# Patient Record
Sex: FEMALE | Race: WHITE | Employment: OTHER | URBAN - METROPOLITAN AREA
[De-identification: names, ages, dates, MRNs, and addresses within clinical notes are randomized per-mention and may not be internally consistent; named-entity substitution may affect disease eponyms.]

---

## 2017-01-09 ENCOUNTER — OFFICE VISIT (OUTPATIENT)
Dept: CARDIOLOGY CLINIC | Age: 57
End: 2017-01-09

## 2017-01-09 VITALS
WEIGHT: 201 LBS | BODY MASS INDEX: 34.31 KG/M2 | OXYGEN SATURATION: 93 % | HEIGHT: 64 IN | HEART RATE: 77 BPM | SYSTOLIC BLOOD PRESSURE: 116 MMHG | DIASTOLIC BLOOD PRESSURE: 72 MMHG

## 2017-01-09 DIAGNOSIS — R00.0 TACHYCARDIA: Primary | ICD-10-CM

## 2017-01-09 PROCEDURE — 99214 OFFICE O/P EST MOD 30 MIN: CPT | Performed by: INTERNAL MEDICINE

## 2017-01-09 PROCEDURE — 93000 ELECTROCARDIOGRAM COMPLETE: CPT | Performed by: INTERNAL MEDICINE

## 2017-02-11 RX ORDER — METOPROLOL SUCCINATE 50 MG/1
TABLET, EXTENDED RELEASE ORAL
Qty: 45 TABLET | Refills: 6 | Status: SHIPPED | OUTPATIENT
Start: 2017-02-11 | End: 2017-09-15 | Stop reason: SDUPTHER

## 2017-03-07 ENCOUNTER — OFFICE VISIT (OUTPATIENT)
Dept: PULMONOLOGY | Age: 57
End: 2017-03-07

## 2017-03-07 VITALS
OXYGEN SATURATION: 93 % | TEMPERATURE: 98.6 F | DIASTOLIC BLOOD PRESSURE: 70 MMHG | HEART RATE: 82 BPM | SYSTOLIC BLOOD PRESSURE: 122 MMHG | HEIGHT: 64 IN | RESPIRATION RATE: 16 BRPM | BODY MASS INDEX: 34.66 KG/M2 | WEIGHT: 203 LBS

## 2017-03-07 DIAGNOSIS — J96.11 CHRONIC RESPIRATORY FAILURE WITH HYPOXIA (HCC): ICD-10-CM

## 2017-03-07 DIAGNOSIS — J32.9 RHINOSINUSITIS: ICD-10-CM

## 2017-03-07 DIAGNOSIS — J44.9 MODERATE COPD (CHRONIC OBSTRUCTIVE PULMONARY DISEASE) (HCC): Primary | ICD-10-CM

## 2017-03-07 DIAGNOSIS — J31.0 RHINOSINUSITIS: ICD-10-CM

## 2017-03-07 DIAGNOSIS — G47.33 OSA TREATED WITH BIPAP: ICD-10-CM

## 2017-03-07 PROCEDURE — 99214 OFFICE O/P EST MOD 30 MIN: CPT | Performed by: INTERNAL MEDICINE

## 2017-03-07 PROCEDURE — G8484 FLU IMMUNIZE NO ADMIN: HCPCS | Performed by: INTERNAL MEDICINE

## 2017-03-07 PROCEDURE — 4004F PT TOBACCO SCREEN RCVD TLK: CPT | Performed by: INTERNAL MEDICINE

## 2017-03-07 PROCEDURE — 3017F COLORECTAL CA SCREEN DOC REV: CPT | Performed by: INTERNAL MEDICINE

## 2017-03-07 PROCEDURE — G8926 SPIRO NO PERF OR DOC: HCPCS | Performed by: INTERNAL MEDICINE

## 2017-03-07 PROCEDURE — G8417 CALC BMI ABV UP PARAM F/U: HCPCS | Performed by: INTERNAL MEDICINE

## 2017-03-07 PROCEDURE — 3023F SPIROM DOC REV: CPT | Performed by: INTERNAL MEDICINE

## 2017-03-07 PROCEDURE — G8427 DOCREV CUR MEDS BY ELIG CLIN: HCPCS | Performed by: INTERNAL MEDICINE

## 2017-03-07 PROCEDURE — 3014F SCREEN MAMMO DOC REV: CPT | Performed by: INTERNAL MEDICINE

## 2017-03-07 PROCEDURE — G8598 ASA/ANTIPLAT THER USED: HCPCS | Performed by: INTERNAL MEDICINE

## 2017-03-07 RX ORDER — MONTELUKAST SODIUM 10 MG/1
10 TABLET ORAL NIGHTLY
Qty: 30 TABLET | Refills: 6 | Status: SHIPPED | OUTPATIENT
Start: 2017-03-07 | End: 2017-10-12 | Stop reason: SDUPTHER

## 2017-03-07 RX ORDER — ALBUTEROL SULFATE 90 UG/1
2 AEROSOL, METERED RESPIRATORY (INHALATION) EVERY 4 HOURS PRN
Qty: 1 INHALER | Refills: 11 | Status: SHIPPED | OUTPATIENT
Start: 2017-03-07 | End: 2017-10-25 | Stop reason: SDUPTHER

## 2017-03-07 ASSESSMENT — SLEEP AND FATIGUE QUESTIONNAIRES
HOW LIKELY ARE YOU TO NOD OFF OR FALL ASLEEP WHILE SITTING QUIETLY AFTER LUNCH WITHOUT ALCOHOL: 0
HOW LIKELY ARE YOU TO NOD OFF OR FALL ASLEEP WHILE WATCHING TV: 0
HOW LIKELY ARE YOU TO NOD OFF OR FALL ASLEEP WHILE SITTING INACTIVE IN A PUBLIC PLACE: 1
HOW LIKELY ARE YOU TO NOD OFF OR FALL ASLEEP WHEN YOU ARE A PASSENGER IN A CAR FOR AN HOUR WITHOUT A BREAK: 0
ESS TOTAL SCORE: 4
HOW LIKELY ARE YOU TO NOD OFF OR FALL ASLEEP WHILE LYING DOWN TO REST IN THE AFTERNOON WHEN CIRCUMSTANCES PERMIT: 3
HOW LIKELY ARE YOU TO NOD OFF OR FALL ASLEEP WHILE SITTING AND READING: 0
HOW LIKELY ARE YOU TO NOD OFF OR FALL ASLEEP WHILE SITTING AND TALKING TO SOMEONE: 0
HOW LIKELY ARE YOU TO NOD OFF OR FALL ASLEEP IN A CAR, WHILE STOPPED FOR A FEW MINUTES IN TRAFFIC: 0

## 2017-09-13 ENCOUNTER — TELEPHONE (OUTPATIENT)
Dept: PHARMACY | Facility: CLINIC | Age: 57
End: 2017-09-13

## 2017-09-15 RX ORDER — METOPROLOL SUCCINATE 50 MG/1
TABLET, EXTENDED RELEASE ORAL
Qty: 30 TABLET | Refills: 6 | Status: SHIPPED | OUTPATIENT
Start: 2017-09-15 | End: 2018-02-07 | Stop reason: SDUPTHER

## 2017-10-12 DIAGNOSIS — J31.0 RHINOSINUSITIS: ICD-10-CM

## 2017-10-12 DIAGNOSIS — J32.9 RHINOSINUSITIS: ICD-10-CM

## 2017-10-13 RX ORDER — MONTELUKAST SODIUM 10 MG/1
TABLET ORAL
Qty: 30 TABLET | Refills: 0 | Status: SHIPPED | OUTPATIENT
Start: 2017-10-13 | End: 2017-11-13 | Stop reason: SDUPTHER

## 2017-10-25 ENCOUNTER — OFFICE VISIT (OUTPATIENT)
Dept: PULMONOLOGY | Age: 57
End: 2017-10-25

## 2017-10-25 VITALS
BODY MASS INDEX: 33.46 KG/M2 | DIASTOLIC BLOOD PRESSURE: 72 MMHG | SYSTOLIC BLOOD PRESSURE: 114 MMHG | HEIGHT: 64 IN | OXYGEN SATURATION: 95 % | WEIGHT: 196 LBS | TEMPERATURE: 97.7 F | HEART RATE: 73 BPM

## 2017-10-25 DIAGNOSIS — J32.9 RHINOSINUSITIS: ICD-10-CM

## 2017-10-25 DIAGNOSIS — F17.210 NICOTINE DEPENDENCE, CIGARETTES, UNCOMPLICATED: ICD-10-CM

## 2017-10-25 DIAGNOSIS — J31.0 RHINOSINUSITIS: ICD-10-CM

## 2017-10-25 DIAGNOSIS — J96.11 CHRONIC RESPIRATORY FAILURE WITH HYPOXIA (HCC): ICD-10-CM

## 2017-10-25 DIAGNOSIS — J44.9 MODERATE COPD (CHRONIC OBSTRUCTIVE PULMONARY DISEASE) (HCC): Primary | ICD-10-CM

## 2017-10-25 PROCEDURE — G8926 SPIRO NO PERF OR DOC: HCPCS | Performed by: INTERNAL MEDICINE

## 2017-10-25 PROCEDURE — G8417 CALC BMI ABV UP PARAM F/U: HCPCS | Performed by: INTERNAL MEDICINE

## 2017-10-25 PROCEDURE — 3017F COLORECTAL CA SCREEN DOC REV: CPT | Performed by: INTERNAL MEDICINE

## 2017-10-25 PROCEDURE — G8598 ASA/ANTIPLAT THER USED: HCPCS | Performed by: INTERNAL MEDICINE

## 2017-10-25 PROCEDURE — G0296 VISIT TO DETERM LDCT ELIG: HCPCS | Performed by: INTERNAL MEDICINE

## 2017-10-25 PROCEDURE — 4004F PT TOBACCO SCREEN RCVD TLK: CPT | Performed by: INTERNAL MEDICINE

## 2017-10-25 PROCEDURE — 3014F SCREEN MAMMO DOC REV: CPT | Performed by: INTERNAL MEDICINE

## 2017-10-25 PROCEDURE — G8427 DOCREV CUR MEDS BY ELIG CLIN: HCPCS | Performed by: INTERNAL MEDICINE

## 2017-10-25 PROCEDURE — 99214 OFFICE O/P EST MOD 30 MIN: CPT | Performed by: INTERNAL MEDICINE

## 2017-10-25 PROCEDURE — 3023F SPIROM DOC REV: CPT | Performed by: INTERNAL MEDICINE

## 2017-10-25 PROCEDURE — G8484 FLU IMMUNIZE NO ADMIN: HCPCS | Performed by: INTERNAL MEDICINE

## 2017-10-25 RX ORDER — ALBUTEROL SULFATE 90 UG/1
2 AEROSOL, METERED RESPIRATORY (INHALATION) EVERY 4 HOURS PRN
Qty: 1 INHALER | Refills: 11 | Status: SHIPPED | OUTPATIENT
Start: 2017-10-25 | End: 2018-09-05

## 2017-10-25 NOTE — PROGRESS NOTES
Procedure Laterality Date    ABDOMEN SURGERY       x 2    CARDIAC SURGERY      CHOLECYSTECTOMY      CORONARY ANGIOPLASTY WITH STENT PLACEMENT      LEEP      abnormal cells (cancerous)     Current Outpatient Prescriptions   Medication Sig Dispense Refill    Cyanocobalamin (VITAMIN B 12 PO) Take by mouth      SYMBICORT 160-4.5 MCG/ACT AERO INHALE 2 PUFFS TWICE DAILY 10.2 g 0    fenofibrate (TRICOR) 145 MG tablet TAKE 1 TABLET BY MOUTH DAILY 30 tablet 0    SPIRIVA HANDIHALER 18 MCG inhalation capsule INHALE 1 DOSE BY MOUTH ONCE DAILY 30 capsule 0    montelukast (SINGULAIR) 10 MG tablet TAKE 1 TABLET BY MOUTH EVERY NIGHT 30 tablet 0    metFORMIN (GLUCOPHAGE) 1000 MG tablet TAKE 1 TABLET BY MOUTH TWICE DAILY 60 tablet 0    Insulin Syringe-Needle U-100 (INSULIN SYRINGE .5CC/30GX5/16\") 30G X 5/16\" 0.5 ML MISC AS DIRECTED THREE TIMES DAILY 100 each 3    NOVOLOG 100 UNIT/ML injection vial USE FOR SLIDING SCALE 10 mL 0    metoprolol succinate (TOPROL XL) 50 MG extended release tablet Take one and one half tablet daily 30 tablet 6    nystatin (MYCOSTATIN) 433917 UNIT/GM powder Apply 3 times daily.  1 Bottle 2    sertraline (ZOLOFT) 100 MG tablet TAKE 2 TABLETS BY MOUTH EVERY NIGHT AT BEDTIME 60 tablet 0    lisinopril (PRINIVIL;ZESTRIL) 20 MG tablet TAKE 1 TABLET BY MOUTH DAILY 30 tablet 0    levothyroxine (SYNTHROID) 25 MCG tablet TAKE 1 TABLET BY MOUTH DAILY 30 tablet 0    diazepam (VALIUM) 5 MG tablet TAKE 1 TABLET BY MOUTH EVERY 8 HOURS AS NEEDED FOR ANXIETY OR SLEEP 90 tablet 0    HUMALOG 100 UNIT/ML injection vial USE FOR SLIDING SCALE 10 mL 0    albuterol (PROVENTIL) (2.5 MG/3ML) 0.083% nebulizer solution Take 3 mLs by nebulization every 6 hours as needed for Wheezing 120 each 3    insulin glargine (LANTUS) 100 UNIT/ML injection vial Inject 22 Units into the skin nightly 10 mL 0    atorvastatin (LIPITOR) 20 MG tablet TAKE 1 TABLET BY MOUTH DAILY 30 tablet 0    albuterol (PROAIR HFA) 108 (90 BASE) MCG/ACT inhaler Inhale 2 puffs into the lungs every 4 hours as needed for Wheezing or Shortness of Breath 1 Inhaler 11    HYDROcodone-acetaminophen (NORCO) 5-325 MG per tablet Take 1 tablet by mouth every 8 hours as needed for Pain      Vitamin D (CHOLECALCIFEROL) 1000 UNITS CAPS capsule Take 1,000 Units by mouth 2 times daily.  Melatonin 5 MG TABS tablet Take 1 tablet by mouth nightly.  OXYGEN Inhale  into the lungs. 3 liters per minute      BiPAP Machine MISC by Does not apply route.  omeprazole (PRILOSEC) 20 MG capsule Take 20 mg by mouth 2 times daily.  aspirin 81 MG tablet Take 81 mg by mouth nightly.  Omega-3 Fatty Acids (FISH OIL) 1000 MG CAPS Take 1,000 mg by mouth 2 times daily. No current facility-administered medications for this visit.             ROS:  GENERAL:  No fevers, chills, or night sweats  HEENT:  Improved congestion  HEART:  No chest pain,  LUNGS: Stable SOB, low usage of albuterol  ABDOMEN:  No abdominal pains, no changes in stools  GENITOURINARY:  No increased frequency, no blood in urine  EXTREMITIES:  No swelling, no lesions  NEURO:  Back pain which is chronic  SKIN:  No new rashes, no changes in hair or nails  LYMPH:  No masses, no swelling neck, armpits, or groin    PHYSICAL EXAM:  Vitals:    10/25/17 0940   BP: 114/72   Pulse: 73   Temp: 97.7 °F (36.5 °C)   SpO2: 95%       GENERAL:  Well nourished, alert, appears stated age, no distress, older than listed age,  HEENT:  No scleral icterus, no conjunctival irritation  NECK:  No thyromegaly, no bruits  LYMPH:  No cervical or supraclavicular adenopathy  HEART:  Regular rate and rhythm, no murmurs  LUNGS: Faint inspiratory wheezes with mild expiratory wheezing too  ABDOMEN:  No distention, no organomegaly  EXTREMITIES:  trace edema, no digital clubbing  NEURO:  No localizing deficits, CN II-XII intact    Pulmonary Function Testing  9/2013  Moderate obstructive ventilatory defect with reversibility, with  reactively preserved lung volumes and mildly reduced DLCO overall results  most consistent with clinical diagnosis of bronchial asthma, status post  clinical correlation. Chest CT from 10/2016 is reviewed. My interpretation is emphysema with calcified and non-calcified nodules    6 MWT  The patient ambulated 244 meters which is abnormal- 49% predicted. Her heart rate response was normal, the blood pressure response was normal, oxygen saturations were abnormal in that she desaturated to 87% while on room air at the start of testing and required 2 liters nasal cannula to bring saturations up to 95%, and degree of symptoms were increased with testing    Lab Results   Component Value Date    WBC 12.3 (H) 02/16/2015    HGB 13.6 02/16/2015    HCT 42.1 02/16/2015    MCV 88.0 02/16/2015     02/16/2015       Lab Results   Component Value Date    BNP <5.0 08/09/2012       Lab Results   Component Value Date    CREATININE 0.5 (L) 09/20/2017    BUN 13 09/20/2017     09/20/2017    K 4.8 09/20/2017    CL 94 (L) 09/20/2017    CO2 19 (L) 09/20/2017     Assessment/Plan:  1. Moderate COPD (chronic obstructive pulmonary disease) ([facility] [address]  Continue with triple therapy. - albuterol sulfate  (90 Base) MCG/ACT inhaler; Inhale 2 puffs into the lungs every 4 hours as needed for Wheezing or Shortness of Breath (or cough)  Dispense: 1 Inhaler; Refill: 11    2. Chronic respiratory failure with hypoxia (HCC)  Use 3 liters with exertion. She does get benefit from the oxygen    3. Rhinosinusitis  Improved with singulair. She does not like intra-nasal medications    4. Nicotine dependence, cigarettes, uncomplicated  CT Screen due this month. She needs to continue to work on decreasing her cigarette usage. She has cut it in half  - ND VISIT TO DISCUSS LUNG CA SCREEN W LDCT  - CT Lung Screening; Future    Follow up in 6 months    Pulmonary Rehab:   Won't do    Lung Cancer Screening CT:  Ordered today    Low Dose CT

## 2017-10-25 NOTE — PATIENT INSTRUCTIONS
Continue with inhalers and oxygen    Make sure you get your flu shot soon    CT Chest in the next week or 2    Follow up in 6 months    What is lung cancer screening? Lung cancer screening is a way in which doctors check the lungs for early signs of cancer in people who have no symptoms of lung cancer. A low-dose CT scan uses much less radiation than a normal CT scan and shows a more detailed image of the lungs than a standard X-ray. The goal of lung cancer screening is to find cancer early, before it has a chance to grow, spread, or cause problems. One large study found that smokers who were screened with low-dose CT scans were less likely to die of lung cancer than those who were screened with standard X-ray. Below is a summary of the things you need to know regarding screening for lung cancer with low-dose computed tomography (LDCT). This is a screening program that involves routine annual screening with LDCT studies of the lung. The LDCTs are done using low-dose radiation that is not thought to increase your cancer risk. If you have other serious medical conditions (other cancers, congestive heart failure) that limit your life expectancy to less than 10 years, you should not undergo lung cancer screening with LDCT. The chance is 20%-60% that the LDCT result will show abnormalities. This would require additional testing which could include repeat imaging or even invasive procedures. Most (about 95%) of \"abnormal\" LDCT results are false in the sense that no lung cancer is ultimately found. Additionally, some (about 10%) of the cancers found would not affect your life expectancy, even if undetected and untreated. If you are still smoking, the single most important thing that you can do to reduce your risk of dying of lung cancer is to quit. For this screening to be covered by Medicare and most other insurers, strict criteria must be met.   If you do not meet these criteria, but still wish to

## 2017-11-13 DIAGNOSIS — J31.0 RHINOSINUSITIS: ICD-10-CM

## 2017-11-13 DIAGNOSIS — J32.9 RHINOSINUSITIS: ICD-10-CM

## 2017-11-13 RX ORDER — MONTELUKAST SODIUM 10 MG/1
TABLET ORAL
Qty: 30 TABLET | Refills: 0 | Status: SHIPPED | OUTPATIENT
Start: 2017-11-13 | End: 2017-12-14 | Stop reason: SDUPTHER

## 2017-12-14 DIAGNOSIS — J32.9 RHINOSINUSITIS: ICD-10-CM

## 2017-12-14 DIAGNOSIS — J31.0 RHINOSINUSITIS: ICD-10-CM

## 2017-12-18 RX ORDER — MONTELUKAST SODIUM 10 MG/1
TABLET ORAL
Qty: 30 TABLET | Refills: 0 | Status: SHIPPED | OUTPATIENT
Start: 2017-12-18 | End: 2018-01-16 | Stop reason: SDUPTHER

## 2017-12-19 ENCOUNTER — TELEPHONE (OUTPATIENT)
Dept: PHARMACY | Facility: CLINIC | Age: 57
End: 2017-12-19

## 2017-12-21 NOTE — TELEPHONE ENCOUNTER
Patient had appointment on 17. Will send letter to patient.     Dewitte Apgar, PharmD  [address] [facility] Pharmacist  [phone] or [phone] (Option 7)    CLINICAL PHARMACY CONSULT: MED RECONCILIATION/REVIEW ADDENDUM    For Pharmacy Admin Tracking Only    PHSO: Yes  Time Spent (min): 5    Dewitte Apgar, [address] [place] [address]

## 2018-01-04 ENCOUNTER — HOSPITAL ENCOUNTER (OUTPATIENT)
Dept: NEUROLOGY | Age: 58
Discharge: HOME | End: 2018-03-12
Attending: INTERNAL MEDICINE | Admitting: INTERNAL MEDICINE

## 2018-01-04 DIAGNOSIS — F17.210 CIGARETTE NICOTINE DEPENDENCE, UNCOMPLICATED: ICD-10-CM

## 2018-01-04 NOTE — PROCEDURES
Electrodiagnostic Report  [address] BASHIR Kovacs MD, Michaele Goldberg, DO, Cynthia Shepard MD  Phone: [phone]  Fax: [phone]    01/04/18    Veronda Cushing  65 y.o.  1960  [id number]  Referring Provider: Maureen Campbell MD    Clinical Problem:  65 patient did not show for EMG appointment    EMG SUMMARY TABLE LEFT UPPER     Spontaneous     MUAP   Recruitment    Insertional Activity Fibrillation Potential Positive Sharp Waves   Fasiculation High Frequency Potentials   Amp Duration PPP Pattern   Deltoid C5.6 Ax normal none none none none normal normal normal normal   Biceps C5,6 musc cut normal none none none none normal normal normal normal   Triceps C6,7,8 Radial normal none none none none normal normal normal normal   Pronator Teres C6,7 Median normal none none none none normal normal normal normal   Brachio Rad C5,6 Radial normal none none none none normal normal normal normal   Ext Ind Prop C7,8 Radial normal none none none none normal normal normal normal   Oppones Poll C8-T1 Median normal none none none none normal normal normal normal   1st Dorsal Int C8-T1 Ulnar normal none none none none normal normal normal normal   Cerv Paraspinals C2-T1 PPR       normal none none none none normal normal normal normal     Nerve Conduction Studies     Nerve Sensory Distal Latency (msec) Sensory Distal Latency (msec) Amp uv Amp uv Motor Distal Latency (msec) Motor Distal Latency (msec) Amp kv Amp kv Motor NCV (m/sec) Motor NCV (m/sec)    Right Left Right Left Right Left Right Left Right Left   Median (n<3.6) (n<3.6)   (n<4.3) (n<4.3)   (n>50) (n>50)   Ulnar (n<3.7) (n<3.7)   (n<4.2) (n<4.2)   b.e(n>50)   a.e(>45) b.e(n>50)   a.e(>45)   Radial (n<3.5) (n<3.5)             Summary of EMG and Nerve Conduction Findings:     Overall Impression:    Electronically signed by:  Michaele Goldberg, DO,1/4/2018,9:59 AM

## 2018-01-16 DIAGNOSIS — J32.9 RHINOSINUSITIS: ICD-10-CM

## 2018-01-16 DIAGNOSIS — J31.0 RHINOSINUSITIS: ICD-10-CM

## 2018-01-16 RX ORDER — MONTELUKAST SODIUM 10 MG/1
TABLET ORAL
Qty: 30 TABLET | Refills: 4 | Status: SHIPPED | OUTPATIENT
Start: 2018-01-16 | End: 2018-06-19 | Stop reason: SDUPTHER

## 2018-02-08 RX ORDER — METOPROLOL SUCCINATE 50 MG/1
TABLET, EXTENDED RELEASE ORAL
Qty: 30 TABLET | Refills: 0 | Status: SHIPPED | OUTPATIENT
Start: 2018-02-08 | End: 2018-03-09 | Stop reason: SDUPTHER

## 2018-03-07 RX ORDER — METOPROLOL SUCCINATE 50 MG/1
TABLET, EXTENDED RELEASE ORAL
Qty: 30 TABLET | Refills: 0 | OUTPATIENT
Start: 2018-03-07

## 2018-03-09 RX ORDER — METOPROLOL SUCCINATE 50 MG/1
TABLET, EXTENDED RELEASE ORAL
Qty: 45 TABLET | Refills: 1 | Status: SHIPPED | OUTPATIENT
Start: 2018-03-09 | End: 2018-05-04 | Stop reason: SDUPTHER

## 2018-03-13 ENCOUNTER — HOSPITAL ENCOUNTER (OUTPATIENT)
Dept: CT IMAGING | Age: 58
Discharge: HOME | End: 2018-03-13
Admitting: INTERNAL MEDICINE

## 2018-03-13 ENCOUNTER — HOSPITAL ENCOUNTER (OUTPATIENT)
Dept: WOMENS IMAGING | Age: 58
Discharge: HOME | End: 2018-03-13
Attending: INTERNAL MEDICINE | Admitting: INTERNAL MEDICINE

## 2018-03-13 DIAGNOSIS — F17.210 CIGARETTE NICOTINE DEPENDENCE, UNCOMPLICATED: ICD-10-CM

## 2018-03-13 DIAGNOSIS — F17.210 NICOTINE DEPENDENCE, CIGARETTES, UNCOMPLICATED: ICD-10-CM

## 2018-03-13 DIAGNOSIS — Z12.39 SCREENING BREAST EXAMINATION: ICD-10-CM

## 2018-03-14 ENCOUNTER — CASE MANAGEMENT (OUTPATIENT)
Dept: CASE MANAGEMENT | Age: 58
End: 2018-03-14

## 2018-03-14 LAB
CATARACTS: POSITIVE
DIABETIC RETINOPATHY: NEGATIVE
GLAUCOMA: NEGATIVE
INTRAOCULAR PRESSURE EYE: NORMAL
VISUAL ACUITY DISTANCE LEFT EYE: NORMAL
VISUAL ACUITY DISTANCE RIGHT EYE: NORMAL

## 2018-03-16 ENCOUNTER — OFFICE VISIT (OUTPATIENT)
Dept: ORTHOPEDIC SURGERY | Age: 58
End: 2018-03-16

## 2018-03-16 VITALS
DIASTOLIC BLOOD PRESSURE: 85 MMHG | HEIGHT: 64 IN | BODY MASS INDEX: 33.46 KG/M2 | WEIGHT: 196 LBS | HEART RATE: 76 BPM | SYSTOLIC BLOOD PRESSURE: 148 MMHG

## 2018-03-16 DIAGNOSIS — M17.11 PRIMARY OSTEOARTHRITIS OF RIGHT KNEE: ICD-10-CM

## 2018-03-16 DIAGNOSIS — M25.561 RIGHT KNEE PAIN, UNSPECIFIED CHRONICITY: ICD-10-CM

## 2018-03-16 DIAGNOSIS — M76.899 QUADRICEPS TENDONITIS: Primary | ICD-10-CM

## 2018-03-16 PROCEDURE — 99203 OFFICE O/P NEW LOW 30 MIN: CPT | Performed by: ORTHOPAEDIC SURGERY

## 2018-03-16 PROCEDURE — G8427 DOCREV CUR MEDS BY ELIG CLIN: HCPCS | Performed by: ORTHOPAEDIC SURGERY

## 2018-03-16 PROCEDURE — 3017F COLORECTAL CA SCREEN DOC REV: CPT | Performed by: ORTHOPAEDIC SURGERY

## 2018-03-16 PROCEDURE — G8484 FLU IMMUNIZE NO ADMIN: HCPCS | Performed by: ORTHOPAEDIC SURGERY

## 2018-03-16 PROCEDURE — G8417 CALC BMI ABV UP PARAM F/U: HCPCS | Performed by: ORTHOPAEDIC SURGERY

## 2018-03-16 PROCEDURE — 3014F SCREEN MAMMO DOC REV: CPT | Performed by: ORTHOPAEDIC SURGERY

## 2018-03-16 RX ORDER — METHYLPREDNISOLONE 4 MG/1
TABLET ORAL
Qty: 1 KIT | Refills: 0 | Status: SHIPPED | OUTPATIENT
Start: 2018-03-16 | End: 2018-03-22

## 2018-03-16 RX ORDER — IBUPROFEN 600 MG/1
600 TABLET ORAL 2 TIMES DAILY WITH MEALS
Qty: 60 TABLET | Refills: 1 | Status: SHIPPED | OUTPATIENT
Start: 2018-03-16 | End: 2018-06-20 | Stop reason: SDUPTHER

## 2018-03-16 NOTE — PROGRESS NOTES
Hermine Skiff  [id number]  March 16, 2018    Chief Complaint   Patient presents with    Knee Pain     right, no injury, pain about 1 month       History: The patient is a 60-year-old female here today for evaluation regarding her right knee pain. She reports that she has had the pain for approximately 3-4 weeks. She denies any specific inciting event or injury. The patient does have chronic back issues and takes hydrocodone for her back. The pain medication has helped the right knee pain. She does have difficulty going up and down stairs. She does occasionally have pain at nighttime. She does have pain when straightening the leg. She denies any numbness or tingling. The patient does report that heat has helped her symptoms. This is a consult from Shawanda Lancaster MD for right knee pain. The patient's  past medical history, medications, allergies,  family history, social history, and have been reviewed, and dated and are recorded in the chart. Pertinent items are noted in HPI. Review of systems reviewed from Pertinent History Form dated on 3/16/18 and available in the patient's chart under the Media tab. Vitals:  BP (!) 148/85   Pulse 76   Ht 5' 4\" (1.626 m)   Wt 196 lb (88.9 kg)   BMI 33.64 kg/m²     Physical: Ms. Hermine Skiff appears well, she is in no apparent distress, she demonstrates appropriate mood & affect. She is alert and oriented to person, place and time. Examination of the right lower extremity reveals no pain with range of motion of the hip. She has mild medial joint line tenderness. She does have moderate tenderness palpation over the superior pole of the patella/quadriceps tendon. She has mild pain with resisted right knee extension. Range of motion is from 0 degrees to 120 degrees. Strength is 4+ to 5/5 for all muscle groups about the right knee. There is patellofemoral crepitus with range of motion of the right knee.  Varus and valgus stressing of the knees reveals no evidence of instability. There is a small effusion in the right knee. Anterior drawer and Lachman are negative bilaterally. Examination of the skin reveals no rashes, ulceration, or lesion, bilaterally in the lower extremities. Sensation to both lower extremities is grossly intact. Exam of both feet reveals pedal pulses intact and brisk cap refill. Patient is able to dorsiflex and wiggle all toes. Deep tendon reflexes of the lower extremities are normal and symmetric. Imagin views of the right knee obtained in the office today were reviewed. There is mild to moderate medial joint space narrowing. Otherwise x-rays were unremarkable. Impression: #1 right knee quadriceps tendinitis #2 right knee osteoarthritis    Plan: She was instructed on activity modification and low impact exercising: Natural history and expected course discussed. Questions answered. Rest, ice, compression, and elevation (RICE) therapy. Reduction in offending activity. OTC analgesics as needed. If the patient continues to have pain, we will consider a cortisone injection. The patient was given a prescription for a Medrol Dosepak. She was also given a prescription for ibuprofen.

## 2018-05-04 RX ORDER — METOPROLOL SUCCINATE 50 MG/1
TABLET, EXTENDED RELEASE ORAL
Qty: 45 TABLET | Refills: 1 | Status: SHIPPED | OUTPATIENT
Start: 2018-05-04 | End: 2018-05-18 | Stop reason: SDUPTHER

## 2018-05-18 ENCOUNTER — OFFICE VISIT (OUTPATIENT)
Dept: CARDIOLOGY CLINIC | Age: 58
End: 2018-05-18

## 2018-05-18 VITALS
HEIGHT: 64 IN | SYSTOLIC BLOOD PRESSURE: 126 MMHG | HEART RATE: 90 BPM | DIASTOLIC BLOOD PRESSURE: 84 MMHG | OXYGEN SATURATION: 92 % | WEIGHT: 195 LBS | BODY MASS INDEX: 33.29 KG/M2

## 2018-05-18 DIAGNOSIS — I25.10 ATHEROSCLEROSIS OF NATIVE CORONARY ARTERY OF NATIVE HEART WITHOUT ANGINA PECTORIS: Primary | ICD-10-CM

## 2018-05-18 PROCEDURE — G8417 CALC BMI ABV UP PARAM F/U: HCPCS | Performed by: INTERNAL MEDICINE

## 2018-05-18 PROCEDURE — G8427 DOCREV CUR MEDS BY ELIG CLIN: HCPCS | Performed by: INTERNAL MEDICINE

## 2018-05-18 PROCEDURE — 99214 OFFICE O/P EST MOD 30 MIN: CPT | Performed by: INTERNAL MEDICINE

## 2018-05-18 PROCEDURE — 93000 ELECTROCARDIOGRAM COMPLETE: CPT | Performed by: INTERNAL MEDICINE

## 2018-05-18 PROCEDURE — 4004F PT TOBACCO SCREEN RCVD TLK: CPT | Performed by: INTERNAL MEDICINE

## 2018-05-18 PROCEDURE — 3017F COLORECTAL CA SCREEN DOC REV: CPT | Performed by: INTERNAL MEDICINE

## 2018-05-18 PROCEDURE — G8598 ASA/ANTIPLAT THER USED: HCPCS | Performed by: INTERNAL MEDICINE

## 2018-05-18 RX ORDER — METOPROLOL SUCCINATE 100 MG/1
100 TABLET, EXTENDED RELEASE ORAL NIGHTLY
Qty: 90 TABLET | Refills: 3 | Status: SHIPPED | OUTPATIENT
Start: 2018-05-18 | End: 2018-09-05

## 2018-06-19 DIAGNOSIS — J31.0 RHINOSINUSITIS: ICD-10-CM

## 2018-06-19 DIAGNOSIS — J32.9 RHINOSINUSITIS: ICD-10-CM

## 2018-06-19 RX ORDER — MONTELUKAST SODIUM 10 MG/1
TABLET ORAL
Qty: 30 TABLET | Refills: 3 | Status: SHIPPED | OUTPATIENT
Start: 2018-06-19 | End: 2018-09-05 | Stop reason: ALTCHOICE

## 2018-06-20 DIAGNOSIS — M76.899 QUADRICEPS TENDONITIS: ICD-10-CM

## 2018-06-20 DIAGNOSIS — M17.11 PRIMARY OSTEOARTHRITIS OF RIGHT KNEE: ICD-10-CM

## 2018-06-20 RX ORDER — IBUPROFEN 600 MG/1
TABLET ORAL
Qty: 60 TABLET | Refills: 0 | Status: SHIPPED | OUTPATIENT
Start: 2018-06-20 | End: 2018-09-05

## 2018-08-16 PROBLEM — R19.7 DIARRHEA: Status: ACTIVE | Noted: 2018-08-16

## 2018-09-05 ENCOUNTER — PAT TELEPHONE (OUTPATIENT)
Dept: PREADMISSION TESTING | Age: 58
End: 2018-09-05

## 2018-09-05 VITALS — BODY MASS INDEX: 32.44 KG/M2 | WEIGHT: 190 LBS | HEIGHT: 64 IN

## 2018-09-05 RX ORDER — ALBUTEROL SULFATE 90 UG/1
AEROSOL, METERED RESPIRATORY (INHALATION)
COMMUNITY
End: 2018-09-05

## 2018-09-05 NOTE — PRE-PROCEDURE INSTRUCTIONS
nail polish on your fingers or toes      For your safety, please do not wear any jewelry or body piercing's on the day of surgery. All jewelry must be removed. If you have dentures, they will be removed before going to operating room. For your convenience, we will provide you with a container. If you wear contact lenses or glasses, they will be removed, please bring a case for them. If you have a living will and a durable power of  for healthcare, please bring in a copy. As part of our patient safety program to minimize surgical site infections, we ask you to do the following:    · Please notify your surgeon if you develop any illness between         now and the  day of your surgery. · This includes a cough, cold, fever, sore throat, nausea,         or vomiting, and diarrhea, etc.  ·  Please notify your surgeon if you experience dizziness, shortness         of breath or blurred vision between now and the time of your surgery. Do not shave your operative site 96 hours prior to surgery. For face and neck surgery, men may use an electric razor 48 hours   prior to surgery. You may shower the night before surgery or the morning of   your surgery with an antibacterial soap. You will need to bring a photo ID and insurance card    [facility] has an onsite pharmacy, would you like to utilize our pharmacy     If you will be staying overnight and use a C-pap machine, please bring   your C-pap to hospital     Our goal is to provide you with excellent care, therefore, visitors will be limited to two(2) in the room at a time so that we may focus on providing this care for you. Please contact pre-admission testing if you have any further questions.                  [facility] phone number:  [phone] PAT fax number:  [phone]  Please note these are generalized instructions for all surgical cases, you may be provided with more specific instructions according to your

## 2018-09-07 ENCOUNTER — HOSPITAL ENCOUNTER (OUTPATIENT)
Dept: ENDOSCOPY | Age: 58
Discharge: HOME | End: 2018-09-07
Attending: INTERNAL MEDICINE | Admitting: INTERNAL MEDICINE

## 2018-09-07 VITALS
HEART RATE: 72 BPM | OXYGEN SATURATION: 92 % | RESPIRATION RATE: 16 BRPM | BODY MASS INDEX: 32.39 KG/M2 | TEMPERATURE: 97.6 F | HEIGHT: 64 IN | SYSTOLIC BLOOD PRESSURE: 133 MMHG | WEIGHT: 189.7 LBS | DIASTOLIC BLOOD PRESSURE: 66 MMHG

## 2018-09-07 LAB
GLUCOSE BLD-MCNC: 154 MG/DL (ref 70–99)
GLUCOSE BLD-MCNC: 173 MG/DL (ref 70–99)
PERFORMED ON: ABNORMAL
PERFORMED ON: ABNORMAL

## 2018-09-07 RX ORDER — SODIUM CHLORIDE 9 MG/ML
INJECTION, SOLUTION INTRAVENOUS CONTINUOUS
Status: DISCONTINUED | OUTPATIENT
Start: 2018-09-07 | End: 2018-09-08 | Stop reason: HOSPADM

## 2018-09-07 RX ORDER — SODIUM CHLORIDE 0.9 % (FLUSH) 0.9 %
10 SYRINGE (ML) INJECTION PRN
Status: DISCONTINUED | OUTPATIENT
Start: 2018-09-07 | End: 2018-09-08 | Stop reason: HOSPADM

## 2018-09-07 RX ORDER — SODIUM CHLORIDE 0.9 % (FLUSH) 0.9 %
10 SYRINGE (ML) INJECTION EVERY 12 HOURS SCHEDULED
Status: DISCONTINUED | OUTPATIENT
Start: 2018-09-07 | End: 2018-09-08 | Stop reason: HOSPADM

## 2018-09-07 RX ADMIN — SODIUM CHLORIDE: 9 INJECTION, SOLUTION INTRAVENOUS at 13:38

## 2018-09-07 ASSESSMENT — PAIN DESCRIPTION - DESCRIPTORS: DESCRIPTORS: PRESSURE

## 2018-09-07 ASSESSMENT — PAIN - FUNCTIONAL ASSESSMENT: PAIN_FUNCTIONAL_ASSESSMENT: 0-10

## 2018-09-07 ASSESSMENT — PAIN DESCRIPTION - LOCATION: LOCATION: ABDOMEN

## 2018-09-07 ASSESSMENT — PAIN SCALES - GENERAL
PAINLEVEL_OUTOF10: 7
PAINLEVEL_OUTOF10: 0

## 2018-09-07 ASSESSMENT — PAIN DESCRIPTION - PAIN TYPE: TYPE: SURGICAL PAIN

## 2018-09-07 ASSESSMENT — LIFESTYLE VARIABLES: SMOKING_STATUS: 1

## 2018-09-07 ASSESSMENT — PAIN DESCRIPTION - FREQUENCY: FREQUENCY: CONTINUOUS

## 2018-09-07 NOTE — BRIEF OP NOTE
Brief Postoperative Note  Duane Curt  1960  [id number]    Previous Colonoscopy: No  Date: unknown  Greater than 3 years?  No    Pre-operative Diagnosis: Diarrhea    Post-operative Diagnosis: Same    Procedure: Colonoscopy    Anesthesia: MAC    Surgeons/Assistants: Sammy    Estimated Blood Loss: None    Complications: None    Specimens: Random colon    Findings: See dictated report    Electronically signed by Sang Nunez MD, on 9/7/2018, at 3:24 PM

## 2018-09-07 NOTE — PROGRESS NOTES
Pt sitting up in chair, tolerating po. Discharge instructions given to patient and daughter. IV d/c'd.

## 2018-09-07 NOTE — H&P
[facility]   Pre-operative History and Physical    Patient: Shaunna Pennington  : 1960  Acct#:         HISTORY OF PRESENT ILLNESS:    The patient is a 65 y.o. female  who presents with diarrhea  Past Medical History:        Diagnosis Date    Anxiety     Arthritis     CAD (coronary artery disease)     Cancer ([facility] [address]     cervical cancer - cone biopsy done    COPD (chronic obstructive pulmonary disease) ([facility]     Depression     Diabetes mellitus ([facility]     type II, controlled with pills, not currently on insulin    GERD (gastroesophageal reflux disease)     Hyperlipidemia     Hypertension     Hypothyroidism     Influenza A 14    Movement disorder     muscle spasms    Oxygen deficit     2L continu    Pneumonia     Psychiatric problem     anxiety and depression    Thyroid trouble     Tobacco abuse     Type II or unspecified type diabetes mellitus without mention of complication, not stated as uncontrolled      Past Surgical History:        Procedure Laterality Date    ABDOMEN SURGERY       x 2    CARDIAC SURGERY      stents  and     CERVIX BIOPSY      cone    CHOLECYSTECTOMY      CORONARY ANGIOPLASTY WITH STENT PLACEMENT      LEEP      abnormal cells (cancerous)     Medications Prior to Admission:   Current Outpatient Prescriptions on File Prior to Encounter   Medication Sig Dispense Refill    atorvastatin (LIPITOR) 20 MG tablet Take 20 mg by mouth daily      cetirizine (ZYRTEC) 10 MG tablet Take 10 mg by mouth daily      HYDROcodone-acetaminophen (NORCO) 5-325 MG per tablet Take 1 tablet by mouth 2 times daily as needed for Pain. Adrian Catena lisinopril (PRINIVIL;ZESTRIL) 20 MG tablet Take 20 mg by mouth daily      metFORMIN (GLUCOPHAGE) 500 MG tablet Take 1,000 mg by mouth 2 times daily (with meals)      metoprolol (LOPRESSOR) 100 MG tablet Take 100 mg by mouth every evening      OXYGEN Inhale into the lungs 2L continious      sertraline (ZOLOFT) 100 MG tablet Take 100 mg by mouth every evening      omeprazole (PRILOSEC) 20 MG delayed release capsule Take 40 mg by mouth daily      levothyroxine (SYNTHROID) 25 MCG tablet Take 25 mcg by mouth Daily      diazepam (VALIUM) 5 MG tablet Take 5 mg by mouth every 8 hours as needed for Anxiety. .      tiotropium (SPIRIVA HANDIHALER) 18 MCG inhalation capsule Inhale 18 mcg into the lungs daily      budesonide-formoterol (SYMBICORT) 160-4.5 MCG/ACT AERO Inhale 2 puffs into the lungs 2 times daily      Insulin Aspart (NOVOLOG SC) Inject into the skin Per sliding scale pt states      insulin glargine (LANTUS) 100 UNIT/ML injection vial Inject 20 Units into the skin nightly      albuterol (PROVENTIL) (5 MG/ML) 0.5% nebulizer solution Take 2.5 mg by nebulization every 6 hours as needed for Wheezing      aspirin 81 MG tablet Take 81 mg by mouth daily      fenofibrate 160 MG tablet Take 160 mg by mouth daily      ibuprofen (ADVIL;MOTRIN) 200 MG tablet Take 200 mg by mouth every 6 hours as needed for Pain       No current facility-administered medications on file prior to encounter.       Allergies:  Ciprofloxacin and Sulfa antibiotics    Social History:      Social History     Social History    Marital status:      Spouse name: Rambo Ball Number of children: 2    Years of education: 12     Occupational History    disibility for 3 years      COPD     Social History Main Topics    Smoking status: Current Every Day Smoker     Packs/day: 1.00     Years: 43.00     Types: Cigarettes     Start date: 1/1/1973    Smokeless tobacco: Never Used      Comment: cessation 2/15, she was cutting down    Alcohol use No    Drug use: No    Sexual activity: Yes     Partners: Male     Other Topics Concern    Not on file     Social History Narrative    No narrative on file           Family History:   Family History   Problem Relation Age of Onset   Michail Schwab Cancer Mother         lung    Heart Disease Mother     Heart Disease Father         MI   Michail Schwab Diabetes Father     High Blood Pressure Sister     Heart Disease Brother         multiple heart attacks    Other Brother         diverticulitis         PHYSICAL EXAM:      /86   Pulse 82   Temp 98.2 °F (36.8 °C) (Temporal)   Resp 18   Ht 5' 4\" (1.626 m)   Wt 189 lb 11.2 oz (86 kg)   SpO2 92%   BMI 32.56 kg/m²  I        Heart: Normal    Lungs: Normal    Abdomen: Normal      ASA Grade: ASA 3 - Patient with moderate systemic disease with functional limitations    II (soft palate, uvula, fauces visible)  ASSESSMENT AND PLAN:    1. Patient is a 65 y.o. female here for Colonoscopy  2. Procedure options, risks and benefits reviewed with patient who expresses understanding.

## 2018-09-07 NOTE — ANESTHESIA PRE-OP
[facility] Department of Anesthesiology  Pre-Anesthesia Evaluation/Consultation       Name:  Saqib Mccoy  : 1960  Age:  65 y.o.                                            MRN:  [id number]  Date: 2018           Procedure (Scheduled):  colonoscopy  Surgeon:  Dr. Del Angel Ask   Allergen Reactions    Ciprofloxacin Swelling     Pt states throat swells    Sulfa Antibiotics Swelling     Pt states throat swells and she gets rash     Patient Active Problem List   Diagnosis    Influenza A with respiratory manifestations    Obstructive chronic bronchitis with exacerbation (HCC)    Type II or unspecified type diabetes mellitus without mention of complication, uncontrolled    Other and unspecified hyperlipidemia    Coronary atherosclerosis    Depression    Hypertension    Pain in joint, multiple sites    Osteoarthritis    Other specified disorder of the esophagus    Sleep apnea    Chest pain    CAD (coronary artery disease)    Chronic hypoxemic respiratory failure (HCC)    Acute respiratory failure with hypoxia (HCC)    COPD (chronic obstructive pulmonary disease) (HCC)    Respiratory insufficiency    COPD exacerbation (HCC)    Chronic respiratory failure with hypoxia (HCC)    Acute respiratory failure with hypoxia (HCC)    Tobacco abuse    TR on CPAP    TR treated with BiPAP    Moderate COPD (chronic obstructive pulmonary disease) (HCC)    Rhinosinusitis    Diarrhea    Type 2 diabetes mellitus with hyperglycemia (HCC)    Anxiety     Past Medical History:   Diagnosis Date    Anxiety     Arthritis     CAD (coronary artery disease)     Cancer (HCC)     cervical cancer - cone biopsy done    COPD (chronic obstructive pulmonary disease) (HCC)     Depression     Diabetes mellitus (HCC)     type II, controlled with pills, not currently on insulin    GERD (gastroesophageal reflux disease)     Hyperlipidemia     Hypertension     Hypothyroidism     Influenza A (LANTUS) 100 UNIT/ML injection vial Inject 20 Units into the skin nightly      albuterol (PROVENTIL) (5 MG/ML) 0.5% nebulizer solution Take 2.5 mg by nebulization every 6 hours as needed for Wheezing      aspirin 81 MG tablet Take 81 mg by mouth daily      fenofibrate 160 MG tablet Take 160 mg by mouth daily      ibuprofen (ADVIL;MOTRIN) 200 MG tablet Take 200 mg by mouth every 6 hours as needed for Pain       Current Facility-Administered Medications   Medication Dose Route Frequency Provider Last Rate Last Dose    sodium chloride flush 0.9 % injection 10 mL  10 mL Intravenous 2 times per day Mihaela Jean MD        sodium chloride flush 0.9 % injection 10 mL  10 mL Intravenous PRN Mihaela Jean MD        0.9 % sodium chloride infusion   Intravenous Continuous Mihaela Jean MD         Vital Signs (Current)   Vitals:    18 1325   BP: 137/86   Pulse: 82   Resp: 18   Temp: 98.2 °F (36.8 °C)   SpO2: 92%     Vital Signs Statistics (for past 48 hrs)     Temp  Av.2 °F (36.8 °C)  Min: 98.2 °F (36.8 °C)   Min taken time: 18 1325  Max: 98.2 °F (36.8 °C)   Max taken time: 18 1325  Pulse  Av  Min: 82   Min taken time: 18 1325  Max: 82   Max taken time: 18 1325  Resp  Av  Min: 18   Min taken time: 18 1325  Max: 18   Max taken time: 18 1325  BP  Min: 137/86   Min taken time: 18 1325  Max: 137/86   Max taken time: 18 1325  SpO2  Av %  Min: 92 %   Min taken time: 18 1325  Max: 92 %   Max taken time: 18 1325    BP Readings from Last 3 Encounters:   18 137/86   18 118/80   18 115/69     BMI  Body mass index is 32.56 kg/m². Estimated body mass index is 32.56 kg/m² as calculated from the following:    Height as of this encounter: 5' 4\" (1.626 m). Weight as of this encounter: 189 lb 11.2 oz (86 kg).     CBC   Lab Results   Component Value Date    WBC 8.2 2018    RBC 5.10 2018    HGB 14.8

## 2019-02-04 ENCOUNTER — PATIENT MESSAGE (OUTPATIENT)
Dept: PULMONOLOGY | Age: 59
End: 2019-02-04

## 2019-02-05 ENCOUNTER — OFFICE VISIT (OUTPATIENT)
Dept: PULMONOLOGY | Age: 59
End: 2019-02-05
Payer: MEDICARE

## 2019-02-05 VITALS
HEIGHT: 64 IN | DIASTOLIC BLOOD PRESSURE: 81 MMHG | HEART RATE: 76 BPM | RESPIRATION RATE: 20 BRPM | TEMPERATURE: 97.5 F | OXYGEN SATURATION: 93 % | WEIGHT: 192 LBS | SYSTOLIC BLOOD PRESSURE: 124 MMHG | BODY MASS INDEX: 32.78 KG/M2

## 2019-02-05 DIAGNOSIS — Z87.891 PERSONAL HISTORY OF TOBACCO USE: ICD-10-CM

## 2019-02-05 DIAGNOSIS — G47.33 OSA TREATED WITH BIPAP: ICD-10-CM

## 2019-02-05 DIAGNOSIS — J44.9 MODERATE COPD (CHRONIC OBSTRUCTIVE PULMONARY DISEASE) (HCC): Primary | ICD-10-CM

## 2019-02-05 DIAGNOSIS — J96.11 CHRONIC RESPIRATORY FAILURE WITH HYPOXIA (HCC): ICD-10-CM

## 2019-02-05 DIAGNOSIS — F17.210 NICOTINE DEPENDENCE, CIGARETTES, UNCOMPLICATED: ICD-10-CM

## 2019-02-05 PROCEDURE — G8417 CALC BMI ABV UP PARAM F/U: HCPCS | Performed by: INTERNAL MEDICINE

## 2019-02-05 PROCEDURE — G8598 ASA/ANTIPLAT THER USED: HCPCS | Performed by: INTERNAL MEDICINE

## 2019-02-05 PROCEDURE — 3017F COLORECTAL CA SCREEN DOC REV: CPT | Performed by: INTERNAL MEDICINE

## 2019-02-05 PROCEDURE — G0296 VISIT TO DETERM LDCT ELIG: HCPCS | Performed by: INTERNAL MEDICINE

## 2019-02-05 PROCEDURE — 3023F SPIROM DOC REV: CPT | Performed by: INTERNAL MEDICINE

## 2019-02-05 PROCEDURE — 4004F PT TOBACCO SCREEN RCVD TLK: CPT | Performed by: INTERNAL MEDICINE

## 2019-02-05 PROCEDURE — G8926 SPIRO NO PERF OR DOC: HCPCS | Performed by: INTERNAL MEDICINE

## 2019-02-05 PROCEDURE — 99214 OFFICE O/P EST MOD 30 MIN: CPT | Performed by: INTERNAL MEDICINE

## 2019-02-05 PROCEDURE — G8482 FLU IMMUNIZE ORDER/ADMIN: HCPCS | Performed by: INTERNAL MEDICINE

## 2019-02-05 PROCEDURE — G8427 DOCREV CUR MEDS BY ELIG CLIN: HCPCS | Performed by: INTERNAL MEDICINE

## 2019-02-05 RX ORDER — ALBUTEROL SULFATE 2.5 MG/3ML
2.5 SOLUTION RESPIRATORY (INHALATION) EVERY 4 HOURS PRN
Qty: 360 ML | Refills: 11 | Status: SHIPPED | OUTPATIENT
Start: 2019-02-05 | End: 2022-01-17 | Stop reason: SDUPTHER

## 2019-02-05 RX ORDER — ALBUTEROL SULFATE 90 UG/1
2 AEROSOL, METERED RESPIRATORY (INHALATION) EVERY 4 HOURS PRN
Qty: 1 INHALER | Refills: 6 | Status: SHIPPED | OUTPATIENT
Start: 2019-02-05

## 2019-02-05 ASSESSMENT — SLEEP AND FATIGUE QUESTIONNAIRES
HOW LIKELY ARE YOU TO NOD OFF OR FALL ASLEEP WHILE WATCHING TV: 1
HOW LIKELY ARE YOU TO NOD OFF OR FALL ASLEEP IN A CAR, WHILE STOPPED FOR A FEW MINUTES IN TRAFFIC: 0
HOW LIKELY ARE YOU TO NOD OFF OR FALL ASLEEP WHILE SITTING AND READING: 1
HOW LIKELY ARE YOU TO NOD OFF OR FALL ASLEEP WHILE SITTING AND TALKING TO SOMEONE: 0
HOW LIKELY ARE YOU TO NOD OFF OR FALL ASLEEP WHILE SITTING QUIETLY AFTER LUNCH WITHOUT ALCOHOL: 1
HOW LIKELY ARE YOU TO NOD OFF OR FALL ASLEEP WHILE LYING DOWN TO REST IN THE AFTERNOON WHEN CIRCUMSTANCES PERMIT: 3
ESS TOTAL SCORE: 8
HOW LIKELY ARE YOU TO NOD OFF OR FALL ASLEEP WHILE SITTING INACTIVE IN A PUBLIC PLACE: 1
HOW LIKELY ARE YOU TO NOD OFF OR FALL ASLEEP WHEN YOU ARE A PASSENGER IN A CAR FOR AN HOUR WITHOUT A BREAK: 1

## 2019-04-15 ENCOUNTER — OFFICE VISIT (OUTPATIENT)
Dept: PULMONOLOGY | Age: 59
End: 2019-04-15
Payer: MEDICARE

## 2019-04-15 VITALS
TEMPERATURE: 98.1 F | RESPIRATION RATE: 16 BRPM | HEART RATE: 70 BPM | BODY MASS INDEX: 34.31 KG/M2 | DIASTOLIC BLOOD PRESSURE: 62 MMHG | SYSTOLIC BLOOD PRESSURE: 110 MMHG | OXYGEN SATURATION: 92 % | WEIGHT: 201 LBS | HEIGHT: 64 IN

## 2019-04-15 DIAGNOSIS — J96.11 CHRONIC RESPIRATORY FAILURE WITH HYPOXIA (HCC): ICD-10-CM

## 2019-04-15 DIAGNOSIS — J44.9 MODERATE COPD (CHRONIC OBSTRUCTIVE PULMONARY DISEASE) (HCC): ICD-10-CM

## 2019-04-15 DIAGNOSIS — G47.33 OSA TREATED WITH BIPAP: Primary | ICD-10-CM

## 2019-04-15 DIAGNOSIS — Z72.0 TOBACCO ABUSE: ICD-10-CM

## 2019-04-15 PROCEDURE — 3023F SPIROM DOC REV: CPT | Performed by: INTERNAL MEDICINE

## 2019-04-15 PROCEDURE — G8427 DOCREV CUR MEDS BY ELIG CLIN: HCPCS | Performed by: INTERNAL MEDICINE

## 2019-04-15 PROCEDURE — G8598 ASA/ANTIPLAT THER USED: HCPCS | Performed by: INTERNAL MEDICINE

## 2019-04-15 PROCEDURE — G8926 SPIRO NO PERF OR DOC: HCPCS | Performed by: INTERNAL MEDICINE

## 2019-04-15 PROCEDURE — G8417 CALC BMI ABV UP PARAM F/U: HCPCS | Performed by: INTERNAL MEDICINE

## 2019-04-15 PROCEDURE — 99214 OFFICE O/P EST MOD 30 MIN: CPT | Performed by: INTERNAL MEDICINE

## 2019-04-15 PROCEDURE — 4004F PT TOBACCO SCREEN RCVD TLK: CPT | Performed by: INTERNAL MEDICINE

## 2019-04-15 PROCEDURE — 3017F COLORECTAL CA SCREEN DOC REV: CPT | Performed by: INTERNAL MEDICINE

## 2019-04-15 ASSESSMENT — SLEEP AND FATIGUE QUESTIONNAIRES
HOW LIKELY ARE YOU TO NOD OFF OR FALL ASLEEP WHILE WATCHING TV: 0
HOW LIKELY ARE YOU TO NOD OFF OR FALL ASLEEP IN A CAR, WHILE STOPPED FOR A FEW MINUTES IN TRAFFIC: 0
HOW LIKELY ARE YOU TO NOD OFF OR FALL ASLEEP WHILE SITTING AND TALKING TO SOMEONE: 0
HOW LIKELY ARE YOU TO NOD OFF OR FALL ASLEEP WHILE SITTING INACTIVE IN A PUBLIC PLACE: 1
HOW LIKELY ARE YOU TO NOD OFF OR FALL ASLEEP WHEN YOU ARE A PASSENGER IN A CAR FOR AN HOUR WITHOUT A BREAK: 1
HOW LIKELY ARE YOU TO NOD OFF OR FALL ASLEEP WHILE SITTING QUIETLY AFTER LUNCH WITHOUT ALCOHOL: 1
HOW LIKELY ARE YOU TO NOD OFF OR FALL ASLEEP WHILE SITTING AND READING: 1
ESS TOTAL SCORE: 7
HOW LIKELY ARE YOU TO NOD OFF OR FALL ASLEEP WHILE LYING DOWN TO REST IN THE AFTERNOON WHEN CIRCUMSTANCES PERMIT: 3

## 2019-04-15 NOTE — PATIENT INSTRUCTIONS
Continue with inhaler    Continue with bipap every night    Work on quitting tobacco    Has appointment in June

## 2019-04-15 NOTE — PROGRESS NOTES
Chief complaint  This is a 63y.o. year old female  who comes to see me with a chief complaint of   Chief Complaint   Patient presents with    Sleep Apnea     1st bpap f/u       HPI  Here with CC on follow up on TR with new bipap machine received and COPD    Her old machine was not working and I sent order for new machine. She did receive it and is here for first time follow up with new machine. This machine works better per her. Machine:  Patient is using a BIPAP machine with 3 liter bleed in. Average usage is 11 hrs 37 min 00 sec. She is meeting 4 or more hours per night 100% of the time. Average AHI is 0.7. BReathing is still so-so due to active tobacco abuse at 1 ppd.   She is overdue for her CT screen, as it was supposed to be completed in Feb    Sleep Medicine 4/15/2019 2/5/2019 3/7/2017 9/14/2016 8/31/2015 5/12/2015   Sitting and reading 1 1 0 1 0 1   Watching TV 0 1 0 1 0 1   Sitting, inactive in a public place (e.g. a theatre or a meeting) 1 1 1 1 0 1   As a passenger in a car for an hour without a break 1 1 0 1 1 1   Lying down to rest in the afternoon when circumstances permit 3 3 3 3 3 3   Sitting and talking to someone 0 0 0 1 0 0   Sitting quietly after a lunch without alcohol 1 1 0 2 2 1   In a car, while stopped for a few minutes in traffic 0 0 0 0 0 0   Total score 7 8 4 10 6 8   Neck circumference - - - 17 - -     Past Medical History:   Diagnosis Date    Anxiety     Arthritis     CAD (coronary artery disease)     Cancer ([facility] [address]     cervical cancer - cone biopsy done    COPD (chronic obstructive pulmonary disease) ([facility] [address]     Depression     Diabetes mellitus ([facility] [address]     type II, controlled with pills, not currently on insulin    GERD (gastroesophageal reflux disease)     Hyperlipidemia     Hypertension     Hypothyroidism     Influenza A 1/23/14    Movement disorder     muscle spasms    Oxygen deficit     2L continu    Pneumonia     Psychiatric problem     anxiety and nebulization every 4 hours as needed for Wheezing 360 mL 11    levothyroxine (SYNTHROID) 25 MCG tablet TAKE 1 TABLET BY MOUTH DAILY 30 tablet 3    NOVOLOG 100 UNIT/ML injection vial USE FOR SLIDING SCALE 10 mL 3    SPIRIVA HANDIHALER 18 MCG inhalation capsule INHALE 1 DOSE BY MOUTH ONCE DAILY 30 capsule 3    sertraline (ZOLOFT) 100 MG tablet TAKE 2 TABLETS BY MOUTH EVERY NIGHT AT BEDTIME 60 tablet 3    omeprazole (PRILOSEC) 40 MG delayed release capsule TAKE 1 CAPSULE BY MOUTH DAILY 30 capsule 3    aspirin 81 MG tablet Take 81 mg by mouth daily      atorvastatin (LIPITOR) 20 MG tablet Take 20 mg by mouth daily      ibuprofen (ADVIL;MOTRIN) 200 MG tablet Take 200 mg by mouth every 6 hours as needed for Pain      metoprolol (LOPRESSOR) 100 MG tablet Take 100 mg by mouth every evening      OXYGEN Inhale into the lungs 2L continious      fenofibrate 160 MG tablet Take 160 mg by mouth daily       No current facility-administered medications for this visit.         ROS:  GENERAL:  No fevers, chills, or night sweats, sleeps well with new machine, takes a lot of naps  HEENT: on and off congestion   HEART:  No chest pain,  LUNGS: Still SOB all of the time  ABDOMEN:  No abdominal pains, no changes in stools  GENITOURINARY:  No increased frequency, no blood in urine  EXTREMITIES:  No swelling, no lesions  NEURO:  Back pain which is chronic  SKIN:  No new rashes, no changes in hair or nails  LYMPH:  No masses, no swelling neck, armpits, or groin    PHYSICAL EXAM:  Vitals:    04/15/19 1234   BP: 110/62   Pulse: 70   Resp: 16   Temp: 98.1 °F (36.7 °C)   SpO2: 92%       GENERAL:  Well nourished, alert, appears stated age, no distress, older than listed age, smells of tobacco, unkempt   HEENT:  No scleral icterus, no conjunctival irritation  NECK:  No thyromegaly, no bruits  LYMPH:  No cervical or supraclavicular adenopathy  HEART:  Regular rate and rhythm, no murmurs  LUNGS: Isolated wheezing   ABDOMEN:  No distention, no organomegaly  EXTREMITIES:  trace edema, no digital clubbing  NEURO:  No localizing deficits, CN II-XII intact    Pulmonary Function Testing  9/2013  Moderate obstructive ventilatory defect with reversibility, with  reactively preserved lung volumes and mildly reduced DLCO overall results most consistent with clinical diagnosis of bronchial asthma, status post clinical correlation. Chest CT from 3/2018 is reviewed. My interpretation is mild emphysema with calcified and non-calcified nodules    6 MWT  The patient ambulated 244 meters which is abnormal- 49% predicted. Her heart rate response was normal, the blood pressure response was normal, oxygen saturations were abnormal in that she desaturated to 87% while on room air at the start of testing and required 2 liters nasal cannula to bring saturations up to 95%, and degree of symptoms were increased with testing    Lab Results   Component Value Date    WBC 8.2 08/17/2018    HGB 14.8 08/17/2018    HCT 44.1 08/17/2018    MCV 86.4 08/17/2018     08/17/2018       Lab Results   Component Value Date    BNP <5.0 08/09/2012       Lab Results   Component Value Date    CREATININE 0.5 (L) 03/26/2019    BUN 18 03/26/2019     03/26/2019    K 5.1 03/26/2019    CL 94 (L) 03/26/2019    CO2 26 03/26/2019     Assessment/Plan:  1. TR treated with BiPAP  Benefiting and compliant. Benefiting from therapy by a low [place] score, good energy levels, low fatigue, and control of chronic medical problems  She is using new machine and doing well. 2. Moderate COPD (chronic obstructive pulmonary disease) (HCC)  Still not controlled. She is still smoking. Has not really committed herself to quitting. On triple therapy    3. Chronic respiratory failure with hypoxia (HCC)  Uses 2-3 liters with benefit. I told her to check saturations levels as she may not need oxygen at rest     4. Tobacco abuse  Active. I challenged her to be down to 16 cigs at next visit.   She seems to think that is a small decrease but I told her she needs to start small and through positive reinforcement may be successful      Follow up in s months    Pulmonary Rehab: Won't do    Lung Cancer Screening CT:  She has outstanding order and is 2 months overdue    Low Dose CT (LDCT) Lung Screening criteria met   Age 50-69   Pack year smoking >30   Still smoking or less than 15 year since quit   No sign or symptoms of lung cancer   > 11 months since last LDCT     Risks and benefits of lung cancer screening with LDCT scans discussed:    Significance of positive screen - False-positive LDCT results often occur. 95% of all positive results do not lead to a diagnosis of cancer. Usually further imaging can resolve most false-positive results; however, some patients may require invasive procedures. Over diagnosis risk - 10% to 12% of screen-detected lung cancer cases are over diagnosed--that is, the cancer would not have been detected in the patient's lifetime without the screening. Need for follow up screens annually to continue lung cancer screening effectiveness     Risks associated with radiation from annual LDCT- Radiation exposure is about the same as for a mammogram, which is about 1/3 of the annual background radiation exposure from everyday life. Starting screening at age 54 is not likely to increase cancer risk from radiation exposure. Patients with comorbidities resulting in life expectancy of < 10 years, or that would preclude treatment of an abnormality identified on CT, should not be screened due to lack of benefit.     To obtain maximal benefit from this screening, smoking cessation and long-term abstinence from smoking is critical

## 2019-05-13 RX ORDER — METOPROLOL SUCCINATE 100 MG/1
TABLET, EXTENDED RELEASE ORAL
Qty: 90 TABLET | Refills: 0 | OUTPATIENT
Start: 2019-05-13

## 2019-06-28 RX ORDER — METOPROLOL TARTRATE 100 MG/1
100 TABLET ORAL EVERY EVENING
Qty: 30 TABLET | Refills: 0 | Status: SHIPPED | OUTPATIENT
Start: 2019-06-28 | End: 2019-07-02

## 2019-06-28 NOTE — TELEPHONE ENCOUNTER
(Check to see if patient has been seen within 1 year, If not, please go ahead and schedule an appointment with the provider while you have the patient on the phone then send telephone message to the clinical staff for review)--DELETE THIS [address]    Medication Refill    Last appointment with cardiology? 5/18/18  Next appointment with Cardiology?  7/12/19    Medication needing refilled:  metoprolol (LOPRESSOR)      Doseage of the medication:  100 MG tablet       How are you taking this medication (QD, BID, TID, QID, PRN):  Take 100 mg by mouth every evening  Patient want a 30 or 90 day supply called in:  30  Which Pharmacy are we sending the medication to    Pharmacy:  [address] [place] [address] - [address] [facility] [address] [phone] - F [phone]    Pt can be reached at [phone]

## 2019-07-02 RX ORDER — METOPROLOL SUCCINATE 100 MG/1
100 TABLET, EXTENDED RELEASE ORAL DAILY
Qty: 30 TABLET | Refills: 3 | Status: SHIPPED | OUTPATIENT
Start: 2019-07-02 | End: 2019-10-23 | Stop reason: SDUPTHER

## 2019-07-02 NOTE — TELEPHONE ENCOUNTER
Abhilash Morrow is calling in this morning regarding the telephone encounter on 06/28/2019. The patient states the pharmacy mentioned to her that this prescription for Metoprolol is Metoprolol Tartrate 100 mg tablets is different than what she normally takes. She wanted to know if Dr. Jj Irby had intentionally changed her prescription. Rachael's last appointment with cardiology was on 05/18/2018. Rachael's next appointment with cardiology is on 07/12/2019. Abhilash Morrow would like a call back this morning in order to discuss this medication and to make sure she is taking the right one. Medication:  Metoprolol Tartrate 100 MG Oral Tablet    Pharmacy: [place] [address] [facility] [address] [facility] [address] [facility] [phone]    You can reach Abhilash Morrow at [phone].

## 2019-07-07 ENCOUNTER — APPOINTMENT (OUTPATIENT)
Dept: GENERAL RADIOLOGY | Age: 59
End: 2019-07-07
Payer: MEDICARE

## 2019-07-07 ENCOUNTER — HOSPITAL ENCOUNTER (EMERGENCY)
Age: 59
Discharge: HOME | End: 2019-07-07
Attending: EMERGENCY MEDICINE
Payer: MEDICARE

## 2019-07-07 VITALS
TEMPERATURE: 98.6 F | OXYGEN SATURATION: 90 % | SYSTOLIC BLOOD PRESSURE: 145 MMHG | RESPIRATION RATE: 20 BRPM | DIASTOLIC BLOOD PRESSURE: 77 MMHG | WEIGHT: 206.79 LBS | BODY MASS INDEX: 35.5 KG/M2 | HEART RATE: 80 BPM

## 2019-07-07 DIAGNOSIS — T07.XXXA MULTIPLE CONTUSIONS: ICD-10-CM

## 2019-07-07 DIAGNOSIS — S80.01XA CONTUSION OF RIGHT KNEE, INITIAL ENCOUNTER: Primary | ICD-10-CM

## 2019-07-07 PROCEDURE — 73560 X-RAY EXAM OF KNEE 1 OR 2: CPT

## 2019-07-07 PROCEDURE — 99284 EMERGENCY DEPT VISIT MOD MDM: CPT

## 2019-07-07 ASSESSMENT — PAIN DESCRIPTION - LOCATION: LOCATION: BACK;KNEE;ARM

## 2019-07-07 ASSESSMENT — PAIN DESCRIPTION - FREQUENCY: FREQUENCY: CONTINUOUS

## 2019-07-07 ASSESSMENT — PAIN SCALES - GENERAL
PAINLEVEL_OUTOF10: 10
PAINLEVEL_OUTOF10: 10

## 2019-07-07 ASSESSMENT — PAIN DESCRIPTION - ORIENTATION: ORIENTATION: RIGHT;LEFT

## 2019-07-07 ASSESSMENT — PAIN DESCRIPTION - PAIN TYPE
TYPE: CHRONIC PAIN;ACUTE PAIN
TYPE: ACUTE PAIN;CHRONIC PAIN

## 2019-07-07 ASSESSMENT — PAIN DESCRIPTION - DESCRIPTORS: DESCRIPTORS: THROBBING

## 2019-07-07 NOTE — ED PROVIDER NOTES
deficit     2L continu    Pneumonia     Psychiatric problem     anxiety and depression    Thyroid trouble     Tobacco abuse     Type II or unspecified type diabetes mellitus without mention of complication, not stated as uncontrolled        SURGICAL HISTORY       Past Surgical History:   Procedure Laterality Date    ABDOMEN SURGERY       x 2    CARDIAC SURGERY      stents  and 2017    CERVIX BIOPSY      cone    CHOLECYSTECTOMY      COLONOSCOPY  2018    CORONARY ANGIOPLASTY WITH STENT PLACEMENT      LEEP      abnormal cells (cancerous)       CURRENT MEDICATIONS       Previous Medications    ALBUTEROL (PROVENTIL) (2.5 MG/3ML) 0.083% NEBULIZER SOLUTION    Take 3 mLs by nebulization every 4 hours as needed for Wheezing    ALBUTEROL SULFATE HFA (PROAIR HFA) 108 (90 BASE) MCG/ACT INHALER    Inhale 2 puffs into the lungs every 4 hours as needed for Wheezing or Shortness of Breath    ASPIRIN 81 MG TABLET    Take 81 mg by mouth daily    ATORVASTATIN (LIPITOR) 20 MG TABLET    Take 20 mg by mouth daily    CETIRIZINE (ZYRTEC) 10 MG TABLET    TAKE 1 TABLET BY MOUTH DAILY    DIAZEPAM (VALIUM) 5 MG TABLET    Take 1 tablet by mouth every 8 hours as needed for Anxiety for up to 30 days. FENOFIBRATE 160 MG TABLET    Take 160 mg by mouth daily    HYDROCODONE-ACETAMINOPHEN (NORCO) 5-325 MG PER TABLET    Take 1 tablet by mouth 2 times daily as needed for Pain for up to 30 days.     IBUPROFEN (ADVIL;MOTRIN) 200 MG TABLET    Take 200 mg by mouth every 6 hours as needed for Pain    INSULIN GLARGINE (LANTUS) 100 UNIT/ML INJECTION VIAL    As above    INSULIN SYRINGE-NEEDLE U-100 (INSULIN SYRINGE .5CC/30GX5/16\") 30G X 5/16\" 0.5 ML MISC    USE AS DIRECTED THREE TIMES DAILY    INSULIN SYRINGE-NEEDLE U-100 (INSULIN SYRINGE .5CC/30GX5/16\") 30G X 5/16\" 0.5 ML MISC    USE THREE TIMES DAILY AS DIRECTED    LEVOTHYROXINE (SYNTHROID) 25 MCG TABLET    TAKE 1 TABLET BY MOUTH DAILY    LISINOPRIL (PRINIVIL;ZESTRIL) 20 MG

## 2019-07-11 PROBLEM — R00.0 TACHYCARDIA: Status: ACTIVE | Noted: 2019-07-11

## 2019-08-05 ENCOUNTER — HOSPITAL ENCOUNTER (OUTPATIENT)
Dept: MRI IMAGING | Age: 59
Discharge: HOME | End: 2019-08-05
Payer: MEDICARE

## 2019-08-05 ENCOUNTER — HOSPITAL ENCOUNTER (OUTPATIENT)
Dept: CT IMAGING | Age: 59
Discharge: HOME | End: 2019-08-05
Payer: MEDICARE

## 2019-08-05 DIAGNOSIS — Z87.891 PERSONAL HISTORY OF TOBACCO USE: ICD-10-CM

## 2019-08-05 DIAGNOSIS — M54.9 BACK PAIN, UNSPECIFIED BACK LOCATION, UNSPECIFIED BACK PAIN LATERALITY, UNSPECIFIED CHRONICITY: ICD-10-CM

## 2019-08-05 PROCEDURE — 72148 MRI LUMBAR SPINE W/O DYE: CPT

## 2019-08-05 PROCEDURE — G0297 LDCT FOR LUNG CA SCREEN: HCPCS

## 2019-10-24 RX ORDER — METOPROLOL SUCCINATE 100 MG/1
100 TABLET, EXTENDED RELEASE ORAL DAILY
Qty: 30 TABLET | Refills: 0 | Status: SHIPPED | OUTPATIENT
Start: 2019-10-24 | End: 2019-11-26 | Stop reason: SDUPTHER

## 2019-11-04 ENCOUNTER — OFFICE VISIT (OUTPATIENT)
Dept: CARDIOLOGY CLINIC | Age: 59
End: 2019-11-04
Payer: MEDICARE

## 2019-11-04 VITALS
DIASTOLIC BLOOD PRESSURE: 64 MMHG | BODY MASS INDEX: 34.66 KG/M2 | WEIGHT: 203 LBS | HEART RATE: 84 BPM | HEIGHT: 64 IN | OXYGEN SATURATION: 94 % | SYSTOLIC BLOOD PRESSURE: 118 MMHG

## 2019-11-04 DIAGNOSIS — R01.1 HEART MURMUR: ICD-10-CM

## 2019-11-04 DIAGNOSIS — I51.89 DIASTOLIC DYSFUNCTION: ICD-10-CM

## 2019-11-04 DIAGNOSIS — I25.10 CORONARY ARTERY DISEASE INVOLVING NATIVE CORONARY ARTERY OF NATIVE HEART WITHOUT ANGINA PECTORIS: Primary | ICD-10-CM

## 2019-11-04 DIAGNOSIS — M79.605 BILATERAL LEG PAIN: ICD-10-CM

## 2019-11-04 DIAGNOSIS — I73.9 CLAUDICATION (HCC): ICD-10-CM

## 2019-11-04 DIAGNOSIS — F17.200 SMOKING ADDICTION: ICD-10-CM

## 2019-11-04 DIAGNOSIS — M79.604 BILATERAL LEG PAIN: ICD-10-CM

## 2019-11-04 PROCEDURE — G8598 ASA/ANTIPLAT THER USED: HCPCS | Performed by: INTERNAL MEDICINE

## 2019-11-04 PROCEDURE — G8427 DOCREV CUR MEDS BY ELIG CLIN: HCPCS | Performed by: INTERNAL MEDICINE

## 2019-11-04 PROCEDURE — G8482 FLU IMMUNIZE ORDER/ADMIN: HCPCS | Performed by: INTERNAL MEDICINE

## 2019-11-04 PROCEDURE — 99214 OFFICE O/P EST MOD 30 MIN: CPT | Performed by: INTERNAL MEDICINE

## 2019-11-04 PROCEDURE — G8417 CALC BMI ABV UP PARAM F/U: HCPCS | Performed by: INTERNAL MEDICINE

## 2019-11-04 PROCEDURE — 4004F PT TOBACCO SCREEN RCVD TLK: CPT | Performed by: INTERNAL MEDICINE

## 2019-11-04 PROCEDURE — 93000 ELECTROCARDIOGRAM COMPLETE: CPT | Performed by: INTERNAL MEDICINE

## 2019-11-04 PROCEDURE — 3017F COLORECTAL CA SCREEN DOC REV: CPT | Performed by: INTERNAL MEDICINE

## 2019-11-26 RX ORDER — METOPROLOL SUCCINATE 100 MG/1
100 TABLET, EXTENDED RELEASE ORAL DAILY
Qty: 30 TABLET | Refills: 5 | Status: SHIPPED | OUTPATIENT
Start: 2019-11-26 | End: 2020-05-26 | Stop reason: SDUPTHER

## 2020-03-24 ENCOUNTER — VIRTUAL VISIT (OUTPATIENT)
Dept: PULMONOLOGY | Age: 60
End: 2020-03-24
Payer: MEDICARE

## 2020-03-24 PROCEDURE — 99442 PR PHYS/QHP TELEPHONE EVALUATION 11-20 MIN: CPT | Performed by: INTERNAL MEDICINE

## 2020-03-24 NOTE — PROGRESS NOTES
TELEPHONE ENCOUNTER FROM HOME DUE TO COVID-19    Chief complaint  This is a 63y.o. year old female  who comes to see me with a chief complaint of   Chief Complaint   Patient presents with    COPD       HPI  Here with CC on follow up on TR with new bipap machine received and COPD    Breathing is somewhat labored. More sob and more use of albuterol. Still on inhalers     Machine:  Patient is using a BIPAP machine with 3 liter bleed in. Average usage is 11 hrs 35 min 33 sec. She is meeting 4 or more hours per night 100% of the time.   Average AHI is 1.5        Sleep Medicine 4/15/2019 2/5/2019 3/7/2017 9/14/2016 8/31/2015 5/12/2015   Sitting and reading 1 1 0 1 0 1   Watching TV 0 1 0 1 0 1   Sitting, inactive in a public place (e.g. a theatre or a meeting) 1 1 1 1 0 1   As a passenger in a car for an hour without a break 1 1 0 1 1 1   Lying down to rest in the afternoon when circumstances permit 3 3 3 3 3 3   Sitting and talking to someone 0 0 0 1 0 0   Sitting quietly after a lunch without alcohol 1 1 0 2 2 1   In a car, while stopped for a few minutes in traffic 0 0 0 0 0 0   Total score 7 8 4 10 6 8   Neck circumference - - - 17 - -     Past Medical History:   Diagnosis Date    Anxiety     Arthritis     CAD (coronary artery disease)     Cancer ([facility] [address]     cervical cancer - cone biopsy done    COPD (chronic obstructive pulmonary disease) ([facility] [address]     Depression     Diabetes mellitus ([facility] [address]     type II, controlled with pills, not currently on insulin    GERD (gastroesophageal reflux disease)     Hyperlipidemia     Hypertension     Hypothyroidism     Influenza A 1/23/14    Movement disorder     muscle spasms    Oxygen deficit     2L continu    Pneumonia     Psychiatric problem     anxiety and depression    Thyroid trouble     Tobacco abuse     Type II or unspecified type diabetes mellitus without mention of complication, not stated as uncontrolled        Past Surgical History:   Procedure Laterality Date    ABDOMEN SURGERY       x 2    CARDIAC SURGERY      stents  and 2017    CERVIX BIOPSY      cone    CHOLECYSTECTOMY      COLONOSCOPY  2018    CORONARY ANGIOPLASTY WITH STENT PLACEMENT      LEEP      abnormal cells (cancerous)     Current Outpatient Medications   Medication Sig Dispense Refill    HYDROcodone-acetaminophen (NORCO) 5-325 MG per tablet Take 1 tablet by mouth 2 times daily as needed for Pain for up to 30 days. 55 tablet 0    diazePAM (VALIUM) 5 MG tablet Take 1 tablet by mouth every 8 hours as needed for Anxiety for up to 30 days.  90 tablet 0    SPIRIVA HANDIHALER 18 MCG inhalation capsule INHALE THE CONTENTS OF 1 CAPSULE VIA INHALATION DEVICE EVERY DAY 30 capsule 0    budesonide-formoterol (SYMBICORT) 160-4.5 MCG/ACT AERO INHALE 2 PUFFS BY MOUTH TWICE DAILY 10.2 g 0    cetirizine (ZYRTEC) 10 MG tablet Take 1 tablet by mouth daily 30 tablet 0    Insulin Syringe-Needle U-100 (INSULIN SYRINGE .5CC/30GX5/16\") 30G X 5/16\" 0.5 ML MISC USE AS DIRECTED THREE TIMES DAILY 100 each 3    insulin aspart (NOVOLOG) 100 UNIT/ML injection vial INJECT AS DIRECTED PER SLIDING SCALE 10 mL 2    atorvastatin (LIPITOR) 20 MG tablet TAKE 1 TABLET BY MOUTH DAILY 30 tablet 2    sertraline (ZOLOFT) 100 MG tablet TAKE 2 TABLETS BY MOUTH EVERY NIGHT AT BEDTIME 60 tablet 2    omeprazole (PRILOSEC) 40 MG delayed release capsule TAKE 1 CAPSULE BY MOUTH DAILY 30 capsule 2    levothyroxine (SYNTHROID) 25 MCG tablet TAKE 1 TABLET BY MOUTH DAILY 30 tablet 2    fenofibrate (TRICOR) 145 MG tablet TAKE 1 TABLET BY MOUTH DAILY 30 tablet 2    metFORMIN (GLUCOPHAGE) 1000 MG tablet TAKE 1 TABLET BY MOUTH TWICE DAILY 60 tablet 3    metoprolol succinate (TOPROL XL) 100 MG extended release tablet TAKE 1 TABLET BY MOUTH DAILY 30 tablet 5    lisinopril (PRINIVIL;ZESTRIL) 20 MG tablet TAKE 1 TABLET BY MOUTH DAILY 30 tablet 5    insulin glargine (LANTUS) 100 UNIT/ML injection vial Inject 38 Units into the degree of symptoms were increased with testing    Lab Results   Component Value Date    WBC 10.1 03/17/2020    HGB 14.0 03/17/2020    HCT 42.7 03/17/2020    MCV 85.1 03/17/2020     03/17/2020       Lab Results   Component Value Date    BNP <5.0 08/09/2012       Lab Results   Component Value Date    CREATININE 0.6 03/17/2020    BUN 22 (H) 03/17/2020     (L) 03/17/2020    K 5.3 (H) 03/17/2020    CL 91 (L) 03/17/2020    CO2 23 03/17/2020     Assessment/Plan:  1. Moderate COPD (chronic obstructive pulmonary disease) ([facility] [address]  Remains on triple therapy. Continue     2. TR treated with BiPAP  Benefiting and compliant. Benefiting from therapy by a low [place] score, good energy levels, low fatigue, and control of chronic medical problems    3. Chronic respiratory failure with hypoxia (HCC)  Using 2-3 liters with benefit. Uses it with BPAP machine as well. She is mobile in the home and does get a lot of benefit     4. Tobacco abuse  Since her  has quit after being sick she has cut down quite a bit. Follow up in 3 months    Pulmonary Rehab:   Won't do    Lung Cancer Screening CT:  9/2019    TELEPHONE VISIT for 10:45 min

## 2020-05-26 RX ORDER — METOPROLOL SUCCINATE 100 MG/1
100 TABLET, EXTENDED RELEASE ORAL DAILY
Qty: 90 TABLET | Refills: 3 | Status: SHIPPED | OUTPATIENT
Start: 2020-05-26 | End: 2021-05-18

## 2020-07-21 ENCOUNTER — TELEPHONE (OUTPATIENT)
Dept: PULMONOLOGY | Age: 60
End: 2020-07-21

## 2020-07-21 NOTE — TELEPHONE ENCOUNTER
Luann umana/ Chencho   Calling in regarding a CMN that she is faxing over that needs to be completed urgently. She stated that this needs to be completed so that the patient can keep her oxygen.

## 2021-01-05 ENCOUNTER — HOSPITAL ENCOUNTER (OUTPATIENT)
Dept: CT IMAGING | Age: 61
Discharge: HOME | End: 2021-01-05
Payer: MEDICARE

## 2021-01-05 DIAGNOSIS — R06.00 DYSPNEA, UNSPECIFIED TYPE: ICD-10-CM

## 2021-01-05 PROCEDURE — 71250 CT THORAX DX C-: CPT

## 2021-01-06 PROBLEM — E66.01 MORBID OBESITY (HCC): Status: ACTIVE | Noted: 2021-01-06

## 2021-01-06 PROBLEM — N18.6 END STAGE RENAL DISEASE (HCC): Status: ACTIVE | Noted: 2021-01-06

## 2021-05-18 ENCOUNTER — TELEPHONE (OUTPATIENT)
Dept: CARDIOLOGY CLINIC | Age: 61
End: 2021-05-18

## 2021-05-18 RX ORDER — METOPROLOL SUCCINATE 100 MG/1
100 TABLET, EXTENDED RELEASE ORAL DAILY
Qty: 90 TABLET | Refills: 0 | Status: SHIPPED | OUTPATIENT
Start: 2021-05-18 | End: 2021-08-20 | Stop reason: SDUPTHER

## 2021-07-16 ENCOUNTER — TELEPHONE (OUTPATIENT)
Dept: PULMONOLOGY | Age: 61
End: 2021-07-16

## 2021-07-16 NOTE — TELEPHONE ENCOUNTER
Hai Sanchez calls. She found out today her bipap was recalled by [employer]. Would like to know if you suggest she continue to use? I suggest she be sure to get machine registered. I also informed her we would probably not get a message back to her until Monday morning and if at all possible continue to use until we call back on Monday if she was comfortable in doing so. Sleep study from [facility] on 5/12/2015 found in Media. Please advise.

## 2021-07-21 PROBLEM — F11.20 OPIOID DEPENDENCE, UNCOMPLICATED (HCC): Status: ACTIVE | Noted: 2021-07-21

## 2021-07-21 PROBLEM — F11.99 OPIOID USE, UNSPECIFIED WITH UNSPECIFIED OPIOID-INDUCED DISORDER (HCC): Status: ACTIVE | Noted: 2021-07-21

## 2021-07-21 PROBLEM — F11.29 OPIOID DEPENDENCE WITH UNSPECIFIED OPIOID-INDUCED DISORDER (HCC): Status: ACTIVE | Noted: 2021-07-21

## 2021-07-23 ENCOUNTER — TELEPHONE (OUTPATIENT)
Dept: PULMONOLOGY | Age: 61
End: 2021-07-23

## 2021-07-23 NOTE — TELEPHONE ENCOUNTER
iJa Fox with [employer] calls. They will not be able to fill the new Bipap order as they have no replacements at this time. I called [facility] to see if they were able to assist in anyway. Per Donn. All machines being recalled must go through [place] recall process. I called Tracie Abdul back and informed her as well of above information.

## 2021-08-09 ENCOUNTER — OFFICE VISIT (OUTPATIENT)
Dept: PULMONOLOGY | Age: 61
End: 2021-08-09
Payer: MEDICARE

## 2021-08-09 VITALS
SYSTOLIC BLOOD PRESSURE: 136 MMHG | DIASTOLIC BLOOD PRESSURE: 80 MMHG | HEART RATE: 89 BPM | WEIGHT: 213 LBS | HEIGHT: 64 IN | TEMPERATURE: 96.9 F | RESPIRATION RATE: 16 BRPM | BODY MASS INDEX: 36.37 KG/M2 | OXYGEN SATURATION: 91 %

## 2021-08-09 DIAGNOSIS — R93.89 ABNORMAL CT OF THE CHEST: ICD-10-CM

## 2021-08-09 DIAGNOSIS — G47.33 OSA TREATED WITH BIPAP: ICD-10-CM

## 2021-08-09 DIAGNOSIS — J96.11 CHRONIC RESPIRATORY FAILURE WITH HYPOXIA (HCC): ICD-10-CM

## 2021-08-09 DIAGNOSIS — J44.9 COPD, MODERATE (HCC): Primary | ICD-10-CM

## 2021-08-09 DIAGNOSIS — Z72.0 TOBACCO ABUSE: ICD-10-CM

## 2021-08-09 PROCEDURE — 3023F SPIROM DOC REV: CPT | Performed by: INTERNAL MEDICINE

## 2021-08-09 PROCEDURE — G8427 DOCREV CUR MEDS BY ELIG CLIN: HCPCS | Performed by: INTERNAL MEDICINE

## 2021-08-09 PROCEDURE — G8417 CALC BMI ABV UP PARAM F/U: HCPCS | Performed by: INTERNAL MEDICINE

## 2021-08-09 PROCEDURE — 99214 OFFICE O/P EST MOD 30 MIN: CPT | Performed by: INTERNAL MEDICINE

## 2021-08-09 PROCEDURE — G8926 SPIRO NO PERF OR DOC: HCPCS | Performed by: INTERNAL MEDICINE

## 2021-08-09 PROCEDURE — 4004F PT TOBACCO SCREEN RCVD TLK: CPT | Performed by: INTERNAL MEDICINE

## 2021-08-09 PROCEDURE — 3017F COLORECTAL CA SCREEN DOC REV: CPT | Performed by: INTERNAL MEDICINE

## 2021-08-09 ASSESSMENT — SLEEP AND FATIGUE QUESTIONNAIRES
ESS TOTAL SCORE: 4
HOW LIKELY ARE YOU TO NOD OFF OR FALL ASLEEP WHEN YOU ARE A PASSENGER IN A CAR FOR AN HOUR WITHOUT A BREAK: 0
HOW LIKELY ARE YOU TO NOD OFF OR FALL ASLEEP IN A CAR, WHILE STOPPED FOR A FEW MINUTES IN TRAFFIC: 0
HOW LIKELY ARE YOU TO NOD OFF OR FALL ASLEEP WHILE SITTING QUIETLY AFTER LUNCH WITHOUT ALCOHOL: 0
HOW LIKELY ARE YOU TO NOD OFF OR FALL ASLEEP WHILE LYING DOWN TO REST IN THE AFTERNOON WHEN CIRCUMSTANCES PERMIT: 3
HOW LIKELY ARE YOU TO NOD OFF OR FALL ASLEEP WHILE SITTING AND TALKING TO SOMEONE: 0
HOW LIKELY ARE YOU TO NOD OFF OR FALL ASLEEP WHILE SITTING AND READING: 1
HOW LIKELY ARE YOU TO NOD OFF OR FALL ASLEEP WHILE WATCHING TV: 0
HOW LIKELY ARE YOU TO NOD OFF OR FALL ASLEEP WHILE SITTING INACTIVE IN A PUBLIC PLACE: 0

## 2021-08-09 NOTE — PROGRESS NOTES
Chief complaint  This is a 62y.o. year old female  who comes to see me with a chief complaint of   Chief Complaint   Patient presents with    COPD    Sleep Apnea       HPI  Here with CC on follow up on COPD/TR    I have not seen her in over one year. She says she is doing ok. She remains on triple therapy and still smoking. She does use up to 2 liters of oxygen from time to time. Says she is tired all of the time. Sleeps nearly 12 hours per day. She is not sure why she is so tired. She does not exercise and has several centrally acting meds on her list.  Her bpap is flagged as being recalled. She did register it. Dr. Gregor aRin has brought up gastric sleeve to her and she is thinking about it. Wonders if she could have surgery with her lung disease    Machine:  Patient is using a BIPAP machine with 3 liter bleed in. Average usage is 12 hrs 33 min 14 sec. She is meeting 4 or more hours per night 98.9% of the time.   Average AHI is 0.7        Sleep Medicine 8/9/2021 4/15/2019 2/5/2019 3/7/2017 9/14/2016 8/31/2015 5/12/2015   Sitting and reading 1 1 1 0 1 0 1   Watching TV 0 0 1 0 1 0 1   Sitting, inactive in a public place (e.g. a theatre or a meeting) 0 1 1 1 1 0 1   As a passenger in a car for an hour without a break 0 1 1 0 1 1 1   Lying down to rest in the afternoon when circumstances permit 3 3 3 3 3 3 3   Sitting and talking to someone 0 0 0 0 1 0 0   Sitting quietly after a lunch without alcohol 0 1 1 0 2 2 1   In a car, while stopped for a few minutes in traffic 0 0 0 0 0 0 0   Total score 4 7 8 4 10 6 8   Neck circumference - - - - 17 - -         Current Outpatient Medications   Medication Sig Dispense Refill    buPROPion (WELLBUTRIN XL) 150 MG extended release tablet TAKE 1 TABLET BY MOUTH EVERY MORNING 30 tablet 3    diazePAM (VALIUM) 5 MG tablet TAKE 1 TABLET BY MOUTH EVERY 8 HOURS AS NEEDED FOR ANXIETY 90 tablet 0    levothyroxine (SYNTHROID) 25 MCG tablet TAKE 1 TABLET BY MOUTH DAILY 90 tablet 0    HYDROcodone-acetaminophen (NORCO) 5-325 MG per tablet Take 1 tablet by mouth 2 times daily as needed for Pain for up to 30 days.  55 tablet 0    LANTUS SOLOSTAR 100 UNIT/ML injection pen ADMINISTER 52 UNITS UNDER THE SKIN EVERY NIGHT 15 mL 2    omeprazole (PRILOSEC) 40 MG delayed release capsule 1 tab po daily 30 capsule 2    sertraline (ZOLOFT) 100 MG tablet TAKE 2 TABLETS BY MOUTH EVERY NIGHT AT BEDTIME 60 tablet 2    fenofibrate (TRICOR) 145 MG tablet TAKE 1 TABLET BY MOUTH DAILY 30 tablet 2    atorvastatin (LIPITOR) 20 MG tablet TAKE 1 TABLET BY MOUTH DAILY 30 tablet 2    cetirizine (ZYRTEC) 10 MG tablet TAKE 1 TABLET BY MOUTH DAILY 30 tablet 1    metoprolol succinate (TOPROL XL) 100 MG extended release tablet TAKE 1 TABLET BY MOUTH DAILY 90 tablet 0    lisinopril (PRINIVIL;ZESTRIL) 20 MG tablet TAKE 1 TABLET BY MOUTH DAILY 30 tablet 5    metFORMIN (GLUCOPHAGE) 1000 MG tablet TAKE 1 TABLET BY MOUTH TWICE DAILY 60 tablet 3    LANTUS 100 UNIT/ML injection vial ADMINISTER 38 UNITS UNDER THE SKIN EVERY NIGHT 30 mL 2    insulin aspart (NOVOLOG) 100 UNIT/ML injection vial INJECT AS DIRECTED PER SLIDING SCALE UP TO 70 UNITS PER DAY 10 mL 2    SYMBICORT 160-4.5 MCG/ACT AERO INHALE 2 PUFFS BY MOUTH TWICE DAILY 10.2 g 5    SPIRIVA HANDIHALER 18 MCG inhalation capsule INHALE THE CONTENTS OF 1 CAPSULE VIA INHALATION DEVICE EVERY DAY 30 capsule 5    Insulin Syringe-Needle U-100 (INSULIN SYRINGE .5CC/30GX5/16\") 30G X 5/16\" 0.5 ML MISC USE THREE TIMES DAILY AS DIRECTED 100 each 3    Insulin Pen Needle ([employer] PEN NEEDLES 29G) 29G X 12MM MISC 1 each by Does not apply route daily 100 each 3    Insulin Syringe-Needle U-100 (INSULIN SYRINGE .5CC/30GX5/16\") 30G X 5/16\" 0.5 ML MISC USE AS DIRECTED THREE TIMES DAILY 100 each 0    albuterol sulfate HFA (PROAIR HFA) 108 (90 Base) MCG/ACT inhaler Inhale 2 puffs into the lungs every 4 hours as needed for Wheezing or Shortness of Breath 1 Inhaler 6    albuterol (PROVENTIL) (2.5 MG/3ML) 0.083% nebulizer solution Take 3 mLs by nebulization every 4 hours as needed for Wheezing 360 mL 11    aspirin 81 MG tablet Take 81 mg by mouth daily      ibuprofen (ADVIL;MOTRIN) 200 MG tablet Take 200 mg by mouth every 6 hours as needed for Pain      OXYGEN Inhale into the lungs 2L continious       No current facility-administered medications for this visit. PE  GENERAL:  Well nourished, alert, appears stated age, no distress, older than listed age  [de-identified]:  No scleral icterus, no conjunctival irritation. Mallampati IV  NECK:  No thyromegaly, no bruits  LYMPH:  No cervical or supraclavicular adenopathy  HEART:  Regular rate and rhythm, no murmurs  LUNGS: Bilateral wheezing   ABDOMEN:  No distention, no organomegaly  EXTREMITIES:  trace edema, no digital clubbing  NEURO:  No localizing deficits, CN II-XII intact    Pulmonary Function Testing  9/2013  Moderate obstructive ventilatory defect with reversibility, with reactively preserved lung volumes and mildly reduced DLCO overall results most consistent with clinical diagnosis of bronchial asthma, status post clinical correlation. Chest CT from 1/21 is reviewed. My interpretation is emphysema, nodules, inflammatory infiltrates     6 MWT  The patient ambulated 244 meters which is abnormal- 49% predicted. Her heart rate response was normal, the blood pressure response was normal, oxygen saturations were abnormal in that she desaturated to 87% while on room air at the start of testing and required 2 liters nasal cannula to bring saturations up to 95%, and degree of symptoms were increased with testing    I reviewed labs and images    Assessment/Plan:  1. COPD, moderate ([address]  Continue with triple therapy. Lung disease likely worse than her PFT due to more years of tobacco.  Really has to quit tobacco in order to get any benefit from inhalers. It is a struggle for her    2. TR treated with BiPAP  Benefiting and compliant. Benefiting from therapy by a low [place] score, good energy levels, low fatigue, and control of chronic medical problems    3. Chronic respiratory failure with hypoxia (HCC)  Uses up to 2 liters of oxygen with exertion. Does help when needed    4. Abnormal CT of the chest  Ground glass/mosaicsim with nodules. I suspect ILD related to smoking    5. Tobacco abuse  Really needs to quit. She is thinking about weight loss surgery however she might need to be tobacco free to have it. I told her to really start concentrating on quitting tobacco while she contemplates weight loss surgery. Weight loss surgery would likely help her. We talked about the pros and cons of short term use of vape to help quit. Follow up in 6 months    Pulmonary Rehab:   Won't do    Lung screenin/21

## 2021-08-09 NOTE — PATIENT INSTRUCTIONS
Continue with inhalers    Use oxygen at 2 liters    Need to stop tobacco    I would clear you for the gastric sleeve in the future    Follow up in 6 months

## 2021-08-20 RX ORDER — METOPROLOL SUCCINATE 100 MG/1
100 TABLET, EXTENDED RELEASE ORAL DAILY
Qty: 90 TABLET | OUTPATIENT
Start: 2021-08-20

## 2021-09-17 ENCOUNTER — OFFICE VISIT (OUTPATIENT)
Dept: CARDIOLOGY CLINIC | Age: 61
End: 2021-09-17
Payer: MEDICARE

## 2021-09-17 VITALS
DIASTOLIC BLOOD PRESSURE: 72 MMHG | HEIGHT: 64 IN | SYSTOLIC BLOOD PRESSURE: 118 MMHG | HEART RATE: 77 BPM | BODY MASS INDEX: 36.54 KG/M2 | OXYGEN SATURATION: 98 % | TEMPERATURE: 97.2 F | WEIGHT: 214 LBS

## 2021-09-17 DIAGNOSIS — I25.10 CORONARY ARTERY DISEASE INVOLVING NATIVE CORONARY ARTERY OF NATIVE HEART WITHOUT ANGINA PECTORIS: Primary | ICD-10-CM

## 2021-09-17 DIAGNOSIS — F17.200 SMOKING ADDICTION: ICD-10-CM

## 2021-09-17 DIAGNOSIS — E78.2 MIXED HYPERLIPIDEMIA: ICD-10-CM

## 2021-09-17 DIAGNOSIS — I51.89 DIASTOLIC DYSFUNCTION: ICD-10-CM

## 2021-09-17 PROCEDURE — 99214 OFFICE O/P EST MOD 30 MIN: CPT | Performed by: INTERNAL MEDICINE

## 2021-09-17 PROCEDURE — G8417 CALC BMI ABV UP PARAM F/U: HCPCS | Performed by: INTERNAL MEDICINE

## 2021-09-17 PROCEDURE — 4004F PT TOBACCO SCREEN RCVD TLK: CPT | Performed by: INTERNAL MEDICINE

## 2021-09-17 PROCEDURE — 3017F COLORECTAL CA SCREEN DOC REV: CPT | Performed by: INTERNAL MEDICINE

## 2021-09-17 PROCEDURE — G8428 CUR MEDS NOT DOCUMENT: HCPCS | Performed by: INTERNAL MEDICINE

## 2021-09-17 PROCEDURE — 93000 ELECTROCARDIOGRAM COMPLETE: CPT | Performed by: INTERNAL MEDICINE

## 2021-09-17 RX ORDER — ATORVASTATIN CALCIUM 40 MG/1
40 TABLET, FILM COATED ORAL DAILY
Qty: 90 TABLET | Refills: 3 | Status: SHIPPED | OUTPATIENT
Start: 2021-09-17 | End: 2022-09-09

## 2021-09-17 NOTE — PROGRESS NOTES
[facility]               Cardiology Progress Note    Wes Singer  1960    2021      CC: \"I am okay. \"    HPI:  The patient is 62 y.o. female with a past medical history significant for diabetes, COPD, hypothyroid, GERD, anxiety and depression and presents for management of her CAD, hypertension, hyperlipidemia, has known coronary artery disease with history of stenting over 10 years ago. Today, he is here for follow up. He says that she has been doing okay. She continues to have shortness of breath with exertion. She says that she does increased heart rate with activity. She does follow with Dr Brianne You for her COPD. She does use Bipap at night. Patient denies exertional chest pain/pressure, dyspnea at rest, PND, orthopnea, palpitations, lightheadedness, weight changes, changes in LE edema, and syncope.      Past Medical History:   Diagnosis Date    Anxiety     Arthritis     CAD (coronary artery disease)     Cancer ([facility] [address]     cervical cancer - cone biopsy done    COPD (chronic obstructive pulmonary disease) (HCC)     Depression     Diabetes mellitus (HCC)     type II, controlled with pills, not currently on insulin    GERD (gastroesophageal reflux disease)     Hyperlipidemia     Hypertension     Hypothyroidism     Influenza A 14    Movement disorder     muscle spasms    Oxygen deficit     2L continu    Pneumonia     Psychiatric problem     anxiety and depression    Thyroid trouble     Tobacco abuse     Type II or unspecified type diabetes mellitus without mention of complication, not stated as uncontrolled      Past Surgical History:   Procedure Laterality Date    ABDOMEN SURGERY       x 2    CARDIAC SURGERY      stents  and     CERVIX BIOPSY      cone    CHOLECYSTECTOMY      COLONOSCOPY  2018    CORONARY ANGIOPLASTY WITH STENT PLACEMENT      LEEP      abnormal cells (cancerous)     Family History   Problem Relation Age of Onset    Cancer Mother         lung    Heart Disease Mother     Heart Disease Father         MI   [de-identified] Diabetes Father     High Blood Pressure Sister     Heart Disease Brother         multiple heart attacks    Other Brother         diverticulitis     Social History     Tobacco Use    Smoking status: Current Every Day Smoker     Packs/day: 1.00     Years: 43.00     Pack years: 43.00     Types: Cigarettes     Start date: 1/1/1973    Smokeless tobacco: Never Used    Tobacco comment: cessation 2/15, she was cutting down   Vaping Use    Vaping Use: Never used   Substance Use Topics    Alcohol use: No    Drug use: No       Allergies   Allergen Reactions    Ciprofloxacin Swelling     Pt states throat swells    Sulfa Antibiotics Swelling     Pt states throat swells and she gets rash     Current Outpatient Medications   Medication Sig Dispense Refill    omeprazole (PRILOSEC) 40 MG delayed release capsule TAKE 1 CAPSULE BY MOUTH DAILY 30 capsule 2    SPIRIVA HANDIHALER 18 MCG inhalation capsule INHALE THE CONTENTS OF 1 CAPSULE VIA INHALATION DEVICE EVERY DAY 30 capsule 5    SYMBICORT 160-4.5 MCG/ACT AERO INHALE 2 PUFFS BY MOUTH TWICE DAILY 10.2 g 5    HYDROcodone-acetaminophen (NORCO) 5-325 MG per tablet Take 1 tablet by mouth 2 times daily as needed for Pain for up to 30 days.  55 tablet 0    Insulin Syringe-Needle U-100 (INSULIN SYRINGE .5CC/30GX5/16\") 30G X 5/16\" 0.5 ML MISC tid 100 each 3    metoprolol succinate (TOPROL XL) 100 MG extended release tablet Take 1 tablet by mouth daily 30 tablet 0    cetirizine (ZYRTEC) 10 MG tablet TAKE 1 TABLET BY MOUTH DAILY 30 tablet 1    buPROPion (WELLBUTRIN XL) 150 MG extended release tablet TAKE 1 TABLET BY MOUTH EVERY MORNING 30 tablet 3    levothyroxine (SYNTHROID) 25 MCG tablet TAKE 1 TABLET BY MOUTH DAILY 90 tablet 0    LANTUS SOLOSTAR 100 UNIT/ML injection pen ADMINISTER 52 UNITS UNDER THE SKIN EVERY NIGHT 15 mL 2    sertraline (ZOLOFT) 100 MG tablet TAKE 2 TABLETS BY MOUTH EVERY NIGHT AT BEDTIME 60 tablet 2    fenofibrate (TRICOR) 145 MG tablet TAKE 1 TABLET BY MOUTH DAILY 30 tablet 2    atorvastatin (LIPITOR) 20 MG tablet TAKE 1 TABLET BY MOUTH DAILY 30 tablet 2    lisinopril (PRINIVIL;ZESTRIL) 20 MG tablet TAKE 1 TABLET BY MOUTH DAILY 30 tablet 5    metFORMIN (GLUCOPHAGE) 1000 MG tablet TAKE 1 TABLET BY MOUTH TWICE DAILY 60 tablet 3    LANTUS 100 UNIT/ML injection vial ADMINISTER 38 UNITS UNDER THE SKIN EVERY NIGHT 30 mL 2    insulin aspart (NOVOLOG) 100 UNIT/ML injection vial INJECT AS DIRECTED PER SLIDING SCALE UP TO 70 UNITS PER DAY 10 mL 2    Insulin Pen Needle ([employer] PEN NEEDLES 29G) 29G X 12MM MISC 1 each by Does not apply route daily 100 each 3    Insulin Syringe-Needle U-100 (INSULIN SYRINGE .5CC/30GX5/16\") 30G X 5/16\" 0.5 ML MISC USE AS DIRECTED THREE TIMES DAILY 100 each 0    albuterol sulfate HFA (PROAIR HFA) 108 (90 Base) MCG/ACT inhaler Inhale 2 puffs into the lungs every 4 hours as needed for Wheezing or Shortness of Breath 1 Inhaler 6    albuterol (PROVENTIL) (2.5 MG/3ML) 0.083% nebulizer solution Take 3 mLs by nebulization every 4 hours as needed for Wheezing 360 mL 11    aspirin 81 MG tablet Take 81 mg by mouth daily      ibuprofen (ADVIL;MOTRIN) 200 MG tablet Take 200 mg by mouth every 6 hours as needed for Pain      OXYGEN Inhale into the lungs 2L continious       No current facility-administered medications for this visit.        Review of Systems: all reviewed and refer to HPI     Constitutional: negative  Eyes: negative  Ears, nose, mouth, throat, and face: negative  Respiratory: negative  Cardiovascular: negative  Gastrointestinal: negative  Genitourinary:negative  Integument/breast: negative  Hematologic/lymphatic: negative  Musculoskeletal:negative  Neurological: positive for negative  Behavioral/Psych: negative  Endocrine: negative  Allergic/Immunologic: negative   Vascular: BLE pain, cramping, aching with activity, endorses sedentary lifestyle      Physical Exam:   There were no vitals filed for this visit. Wt Readings from Last 3 Encounters:   08/09/21 213 lb (96.6 kg)   07/21/21 216 lb (98 kg)   10/05/20 206 lb (93.4 kg)       Constitutional: She is oriented to person, place, and time. She appears well-developed and well-nourished. In no acute distress. Head: Normocephalic and atraumatic. Pupils equal and round. Neck: Neck supple. No JVP or carotid bruit appreciated. No mass and no thyromegaly present. No lymphadenopathy present. Cardiovascular: Normal rate. Normal heart sounds. Exam reveals no gallop and no friction rub. No murmur heard. Pulmonary/Chest: Effort normal and breath sounds normal. No respiratory distress. She has no wheezes, rhonchi or rales. Oxygen therapy in place per NC 2L     Abdominal: Soft, non-tender. Bowel sounds are normal. She exhibits no organomegaly, mass or bruit. Extremities: No edema, cyanosis, or clubbing. Pulses are 2+ radial/dorsalis pedis/posterior tibial/carotid bilaterally. Neurological: Awake  alert and orientedNo gross cranial nerve deficit. Coordination normal.     Skin: Skin is warm and dry. There is no rash or diaphoresis. Psychiatric: She has a normal mood and affect. Her speech is normal and behavior is normal.     Lab Review: all reviewed   Lab Results   Component Value Date    TRIG 243 07/21/2021    HDL 39 07/21/2021    LDLCALC 115 07/21/2021    LDLDIRECT 136 10/03/2018    LABVLDL 49 07/21/2021     Lab Results   Component Value Date    BUN 14 07/21/2021    CREATININE 0.7 07/21/2021     EKG Interpretation: 7/5/16 SR with no acute changes     11/4/19 Sinus Rhythm Poor R-wave progression -nonspecific    consider old anterior infarct. 9/17/21 Sinus rhythm     Image Review: all reviewed     ECHO:4/7/14  Summary  Normal left ventricle size and systolic function with an estimated ejection fraction of 50-55%.   No regional wall motion abnormalities are seen.  There is reversal of E/A inflow velocities across the mitral valve  suggesting impaired left ventricular relaxation. Mitral annular calcification is present. Mild mitral regurgitation is present. Normal right ventricular size and function. Cath:12/1/2014  INTERVENTIONS PERFORMED: We performed intervention of the 1st diagonal branch. It had already stent present which had severe in-stent restenosis, approximately 99% degree severity with ELVIS-2 flow distally. This was post  dilated with a 2.0 x 15 mm balloon angioplasty cannula followed by placement of Xience drug-eluting stents, 2.25 x 20 mm and 2.25 by 12 mm. Both stents were inflated to final stent diameter of 2.48 mm. Cath:1/20/15  INTERVENTION: _RCA__ Artery intervened upon. The lesion was successfully intervened. Post-stenosis of 0%, post-ELVIS 3 flow and TMP grade 3. The vessel was accessed natively. The following items were used: Stent used 3.0x 15 mm TOMMY Xience Alpine stent. SUMMARY: RCA PCI    Assessment/Plan:     Coronary artery disease  No further chest pain or angina like symptoms. --12/1/14 1st diagonal branch, stent with in stent restenosis. PCI with Xience TOMMY and both stents inflated. --1/20/15 PCI to the RCA. Asymptomatic. Continue Asa, B-blocker and statin therapy. Diastolic Dysfunction  Reviewed last echo 2014. Appears compenstated on exam. Continue Ace-I and B-blokcer. Repeat echocardiogram.     Hyperlipidemia  Increase Lipitor to 40 mg daily. Repeat lipid profile in 3 months. Smoking addiction  She continues smoking daily. I have again stressed the importance of smoking cessation and spent 3-5 minutes discussing. COPD:   Dr. Gladys Blackmon. BiPap nightly and oxygen therapy per NC. Continues with routine follow ups. DM:   Managed per Dr. Godwin Mitchell. Follow up in 7 months. Thank you very much for allowing me to participate in the care of your patient.  Please do not hesitate to contact me if you have any questions. Sincerely,    Roxanna Langford MD, MPH      [facility], [address] [facility], [facility]  Ph: [phone]  Fax: [phone]    Physician Attestation:  The scribes documentation has been prepared under my direction and personally reviewed by me in its entirety. I confirm that the note above accurately reflects all work, treatment, procedures, and medical decision making performed by me.

## 2021-09-17 NOTE — PATIENT INSTRUCTIONS
Patient Education        Stopping Smoking: Care Instructions  Your Care Instructions     Cigarette smokers crave the nicotine in cigarettes. Giving it up is much harder than simply changing a habit. Your body has to stop craving the nicotine. It is hard to quit, but you can do it. There are many tools that people use to quit smoking. You may find that combining tools works best for you. There are several steps to quitting. First you get ready to quit. Then you get support to help you. After that, you learn new skills and behaviors to become a nonsmoker. For many people, a necessary step is getting and using medicine. Your doctor will help you set up the plan that best meets your needs. You may want to attend a smoking cessation program to help you quit smoking. When you choose a program, look for one that has proven success. Ask your doctor for ideas. You will greatly increase your chances of success if you take medicine as well as get counseling or join a cessation program.  Some of the changes you feel when you first quit tobacco are uncomfortable. Your body will miss the nicotine at first, and you may feel short-tempered and grumpy. You may have trouble sleeping or concentrating. Medicine can help you deal with these symptoms. You may struggle with changing your smoking habits and rituals. The last step is the tricky one: Be prepared for the smoking urge to continue for a time. This is a lot to deal with, but keep at it. You will feel better. Follow-up care is a key part of your treatment and safety. Be sure to make and go to all appointments, and call your doctor if you are having problems. It's also a good idea to know your test results and keep a list of the medicines you take. How can you care for yourself at home? · Ask your family, friends, and coworkers for support. You have a better chance of quitting if you have help and support.   · Join a support group, such as Nicotine Anonymous, for people who are trying to quit smoking. · Consider signing up for a smoking cessation program, such as the American Lung Association's Freedom from Smoking program.  · Get text messaging support. Go to the website at www.smokefree. gov to sign up for the [facility] program.  · Set a quit date. Pick your date carefully so that it is not right in the middle of a big deadline or stressful time. Once you quit, do not even take a puff. Get rid of all ashtrays and lighters after your last cigarette. Clean your house and your clothes so that they do not smell of smoke. · Learn how to be a nonsmoker. Think about ways you can avoid those things that make you reach for a cigarette. ? Avoid situations that put you at greatest risk for smoking. For some people, it is hard to have a drink with friends without smoking. For others, they might skip a coffee break with coworkers who smoke. ? Change your daily routine. Take a different route to work or eat a meal in a different place. · Cut down on stress. Calm yourself or release tension by doing an activity you enjoy, such as reading a book, taking a hot bath, or gardening. · Talk to your doctor or pharmacist about nicotine replacement therapy, which replaces the nicotine in your body. You still get nicotine but you do not use tobacco. Nicotine replacement products help you slowly reduce the amount of nicotine you need. These products come in several forms, many of them available over-the-counter:  ? Nicotine patches  ? Nicotine gum and lozenges  ? Nicotine inhaler  · Ask your doctor about bupropion (Wellbutrin) or varenicline (Chantix), which are prescription medicines. They do not contain nicotine. They help you by reducing withdrawal symptoms, such as stress and anxiety. · Some people find hypnosis, acupuncture, and massage helpful for ending the smoking habit. · Eat a healthy diet and get regular exercise.  Having healthy habits will help your body move past its craving for nicotine. · Be prepared to keep trying. Most people are not successful the first few times they try to quit. Do not get mad at yourself if you smoke again. Make a list of things you learned and think about when you want to try again, such as next week, next month, or next year. Where can you learn more? Go to https://XRONetpezoeyewivone.PlayEnable. org and sign in to your [employer] account. Enter C448 in the TakeLessons box to learn more about \"Stopping Smoking: Care Instructions. \"     If you do not have an account, please click on the \"Sign Up Now\" link. Current as of: February 11, 2021               Content Version: 12.9  © 2006-2021 Healthwise[employer] Incorporated. Care instructions adapted under license by [facility] ([facility]). If you have questions about a medical condition or this instruction, always ask your healthcare professional. [address] any warranty or liability for your use of this information.

## 2021-09-22 RX ORDER — METOPROLOL SUCCINATE 100 MG/1
100 TABLET, EXTENDED RELEASE ORAL DAILY
Qty: 90 TABLET | Refills: 1 | Status: SHIPPED | OUTPATIENT
Start: 2021-09-22 | End: 2022-01-11 | Stop reason: SDUPTHER

## 2021-09-22 RX ORDER — METOPROLOL SUCCINATE 100 MG/1
100 TABLET, EXTENDED RELEASE ORAL DAILY
Qty: 30 TABLET | Refills: 11 | Status: SHIPPED | OUTPATIENT
Start: 2021-09-22 | End: 2022-06-16

## 2021-09-22 NOTE — TELEPHONE ENCOUNTER
Last ov 9/17/21  Pending appt due in march, 2022 schedule not out  Last refill 8/20/21 #30 NR  Last labs 7/21/21  Last ekg 9/17/21

## 2022-06-13 ENCOUNTER — TELEPHONE (OUTPATIENT)
Dept: CARDIOLOGY CLINIC | Age: 62
End: 2022-06-13

## 2022-06-13 DIAGNOSIS — I51.89 DIASTOLIC DYSFUNCTION: Primary | ICD-10-CM

## 2022-06-13 NOTE — TELEPHONE ENCOUNTER
I called Rosalee Divya and we got her ECHO scheduled for Tuesday 9/27/22 at [facility] arriving at 11:15 am.     We also got her scheduled with Dr. Jyoti Ruiz for Friday 9/30/22 at 1:00 pm    She also wanted me to ask Dr. Jyoti Ruiz what his opinion is for her to have the gastric sleeve procedure. She states that she asked him in the past and he told her to check with Dr. Brittany Alvarado, and she states that Dr. Brittany Alvarado said it would be fine.

## 2022-06-13 NOTE — TELEPHONE ENCOUNTER
Patient sent My Chart message requesting office visit and to complete the previously ordered echo. A new echo order has been placed. Please call to arrange both. She may be scheduled at [facility] or [facility] for echo if willing to travel.

## 2022-06-16 ENCOUNTER — HOSPITAL ENCOUNTER (INPATIENT)
Age: 62
LOS: 1 days | Discharge: HOME | DRG: 250 | End: 2022-06-17
Attending: EMERGENCY MEDICINE | Admitting: INTERNAL MEDICINE
Payer: MEDICARE

## 2022-06-16 ENCOUNTER — APPOINTMENT (OUTPATIENT)
Dept: GENERAL RADIOLOGY | Age: 62
DRG: 250 | End: 2022-06-16
Payer: MEDICARE

## 2022-06-16 DIAGNOSIS — R07.9 CHEST PAIN, UNSPECIFIED TYPE: Primary | ICD-10-CM

## 2022-06-16 LAB
A/G RATIO: 1.4 (ref 1.1–2.2)
ALBUMIN SERPL-MCNC: 4.4 G/DL (ref 3.4–5)
ALP BLD-CCNC: 48 U/L (ref 40–129)
ALT SERPL-CCNC: 29 U/L (ref 10–40)
ANION GAP SERPL CALCULATED.3IONS-SCNC: 17 MMOL/L (ref 3–16)
AST SERPL-CCNC: 27 U/L (ref 15–37)
BASOPHILS ABSOLUTE: 0.1 K/UL (ref 0–0.2)
BASOPHILS RELATIVE PERCENT: 0.6 %
BILIRUB SERPL-MCNC: <0.2 MG/DL (ref 0–1)
BUN BLDV-MCNC: 22 MG/DL (ref 7–20)
CALCIUM SERPL-MCNC: 9.5 MG/DL (ref 8.3–10.6)
CHLORIDE BLD-SCNC: 98 MMOL/L (ref 99–110)
CO2: 23 MMOL/L (ref 21–32)
CREAT SERPL-MCNC: 1 MG/DL (ref 0.6–1.2)
EOSINOPHILS ABSOLUTE: 0.1 K/UL (ref 0–0.6)
EOSINOPHILS RELATIVE PERCENT: 1.1 %
GFR AFRICAN AMERICAN: >60
GFR NON-AFRICAN AMERICAN: 56
GLUCOSE BLD-MCNC: 143 MG/DL (ref 70–99)
GLUCOSE BLD-MCNC: 252 MG/DL (ref 70–99)
HCT VFR BLD CALC: 39.5 % (ref 36–48)
HEMOGLOBIN: 13.1 G/DL (ref 12–16)
LIPASE: 29 U/L (ref 13–60)
LYMPHOCYTES ABSOLUTE: 3.1 K/UL (ref 1–5.1)
LYMPHOCYTES RELATIVE PERCENT: 30.9 %
MCH RBC QN AUTO: 26.2 PG (ref 26–34)
MCHC RBC AUTO-ENTMCNC: 33 G/DL (ref 31–36)
MCV RBC AUTO: 79.4 FL (ref 80–100)
MONOCYTES ABSOLUTE: 0.6 K/UL (ref 0–1.3)
MONOCYTES RELATIVE PERCENT: 6.4 %
NEUTROPHILS ABSOLUTE: 6.1 K/UL (ref 1.7–7.7)
NEUTROPHILS RELATIVE PERCENT: 61 %
PDW BLD-RTO: 15.8 % (ref 12.4–15.4)
PERFORMED ON: ABNORMAL
PLATELET # BLD: 306 K/UL (ref 135–450)
PMV BLD AUTO: 7.8 FL (ref 5–10.5)
POTASSIUM SERPL-SCNC: 4.4 MMOL/L (ref 3.5–5.1)
PRO-BNP: 44 PG/ML (ref 0–124)
RBC # BLD: 4.98 M/UL (ref 4–5.2)
SODIUM BLD-SCNC: 138 MMOL/L (ref 136–145)
TOTAL PROTEIN: 7.5 G/DL (ref 6.4–8.2)
TROPONIN: 0.04 NG/ML
TROPONIN: 0.09 NG/ML
TROPONIN: <0.01 NG/ML
WBC # BLD: 10 K/UL (ref 4–11)

## 2022-06-16 PROCEDURE — 94761 N-INVAS EAR/PLS OXIMETRY MLT: CPT

## 2022-06-16 PROCEDURE — 93005 ELECTROCARDIOGRAM TRACING: CPT | Performed by: NURSE PRACTITIONER

## 2022-06-16 PROCEDURE — 99285 EMERGENCY DEPT VISIT HI MDM: CPT

## 2022-06-16 PROCEDURE — 6370000000 HC RX 637 (ALT 250 FOR IP): Performed by: NURSE PRACTITIONER

## 2022-06-16 PROCEDURE — 85730 THROMBOPLASTIN TIME PARTIAL: CPT

## 2022-06-16 PROCEDURE — 94640 AIRWAY INHALATION TREATMENT: CPT

## 2022-06-16 PROCEDURE — 85025 COMPLETE CBC W/AUTO DIFF WBC: CPT

## 2022-06-16 PROCEDURE — 6370000000 HC RX 637 (ALT 250 FOR IP): Performed by: INTERNAL MEDICINE

## 2022-06-16 PROCEDURE — 83880 ASSAY OF NATRIURETIC PEPTIDE: CPT

## 2022-06-16 PROCEDURE — 1200000000 HC SEMI PRIVATE

## 2022-06-16 PROCEDURE — 84484 ASSAY OF TROPONIN QUANT: CPT

## 2022-06-16 PROCEDURE — 2700000000 HC OXYGEN THERAPY PER DAY

## 2022-06-16 PROCEDURE — 36415 COLL VENOUS BLD VENIPUNCTURE: CPT

## 2022-06-16 PROCEDURE — 80053 COMPREHEN METABOLIC PANEL: CPT

## 2022-06-16 PROCEDURE — 71046 X-RAY EXAM CHEST 2 VIEWS: CPT

## 2022-06-16 PROCEDURE — 83690 ASSAY OF LIPASE: CPT

## 2022-06-16 RX ORDER — FENOFIBRATE 160 MG/1
160 TABLET ORAL DAILY
Status: DISCONTINUED | OUTPATIENT
Start: 2022-06-17 | End: 2022-06-17 | Stop reason: HOSPADM

## 2022-06-16 RX ORDER — LISINOPRIL 20 MG/1
20 TABLET ORAL DAILY
Status: DISCONTINUED | OUTPATIENT
Start: 2022-06-17 | End: 2022-06-17 | Stop reason: HOSPADM

## 2022-06-16 RX ORDER — INSULIN ASPART 100 [IU]/ML
8 INJECTION, SOLUTION INTRAVENOUS; SUBCUTANEOUS
COMMUNITY

## 2022-06-16 RX ORDER — INSULIN LISPRO 100 [IU]/ML
0-6 INJECTION, SOLUTION INTRAVENOUS; SUBCUTANEOUS
Status: DISCONTINUED | OUTPATIENT
Start: 2022-06-17 | End: 2022-06-17 | Stop reason: HOSPADM

## 2022-06-16 RX ORDER — BUPROPION HYDROCHLORIDE 150 MG/1
150 TABLET ORAL EVERY EVENING
COMMUNITY
End: 2022-08-11

## 2022-06-16 RX ORDER — ATORVASTATIN CALCIUM 40 MG/1
40 TABLET, FILM COATED ORAL DAILY
Status: DISCONTINUED | OUTPATIENT
Start: 2022-06-17 | End: 2022-06-17 | Stop reason: HOSPADM

## 2022-06-16 RX ORDER — LEVOTHYROXINE SODIUM 0.03 MG/1
25 TABLET ORAL DAILY
Status: DISCONTINUED | OUTPATIENT
Start: 2022-06-17 | End: 2022-06-17 | Stop reason: HOSPADM

## 2022-06-16 RX ORDER — SERTRALINE HYDROCHLORIDE 100 MG/1
200 TABLET, FILM COATED ORAL EVERY MORNING
Status: DISCONTINUED | OUTPATIENT
Start: 2022-06-17 | End: 2022-06-17 | Stop reason: HOSPADM

## 2022-06-16 RX ORDER — IPRATROPIUM BROMIDE AND ALBUTEROL SULFATE 2.5; .5 MG/3ML; MG/3ML
1 SOLUTION RESPIRATORY (INHALATION)
Status: DISCONTINUED | OUTPATIENT
Start: 2022-06-17 | End: 2022-06-17 | Stop reason: HOSPADM

## 2022-06-16 RX ORDER — METOPROLOL SUCCINATE 50 MG/1
100 TABLET, EXTENDED RELEASE ORAL DAILY
Status: DISCONTINUED | OUTPATIENT
Start: 2022-06-17 | End: 2022-06-17 | Stop reason: HOSPADM

## 2022-06-16 RX ORDER — DIAZEPAM 5 MG/1
5 TABLET ORAL EVERY 8 HOURS PRN
Status: DISCONTINUED | OUTPATIENT
Start: 2022-06-16 | End: 2022-06-17 | Stop reason: HOSPADM

## 2022-06-16 RX ORDER — INSULIN GLARGINE 100 [IU]/ML
60 INJECTION, SOLUTION SUBCUTANEOUS NIGHTLY
COMMUNITY

## 2022-06-16 RX ORDER — INSULIN LISPRO 100 [IU]/ML
0-3 INJECTION, SOLUTION INTRAVENOUS; SUBCUTANEOUS NIGHTLY
Status: DISCONTINUED | OUTPATIENT
Start: 2022-06-16 | End: 2022-06-17 | Stop reason: HOSPADM

## 2022-06-16 RX ORDER — DEXTROSE MONOHYDRATE 50 MG/ML
100 INJECTION, SOLUTION INTRAVENOUS PRN
Status: DISCONTINUED | OUTPATIENT
Start: 2022-06-16 | End: 2022-06-17 | Stop reason: HOSPADM

## 2022-06-16 RX ORDER — BUPROPION HYDROCHLORIDE 150 MG/1
150 TABLET ORAL EVERY EVENING
Status: DISCONTINUED | OUTPATIENT
Start: 2022-06-16 | End: 2022-06-17 | Stop reason: HOSPADM

## 2022-06-16 RX ORDER — ASPIRIN 81 MG/1
324 TABLET, CHEWABLE ORAL ONCE
Status: COMPLETED | OUTPATIENT
Start: 2022-06-16 | End: 2022-06-16

## 2022-06-16 RX ORDER — PANTOPRAZOLE SODIUM 40 MG/1
40 TABLET, DELAYED RELEASE ORAL
Status: DISCONTINUED | OUTPATIENT
Start: 2022-06-17 | End: 2022-06-17 | Stop reason: HOSPADM

## 2022-06-16 RX ORDER — SERTRALINE HYDROCHLORIDE 100 MG/1
200 TABLET, FILM COATED ORAL EVERY MORNING
COMMUNITY

## 2022-06-16 RX ORDER — NITROGLYCERIN 20 MG/100ML
5-200 INJECTION INTRAVENOUS CONTINUOUS
Status: DISCONTINUED | OUTPATIENT
Start: 2022-06-17 | End: 2022-06-17

## 2022-06-16 RX ORDER — INSULIN GLARGINE 100 [IU]/ML
60 INJECTION, SOLUTION SUBCUTANEOUS NIGHTLY
Status: DISCONTINUED | OUTPATIENT
Start: 2022-06-16 | End: 2022-06-17 | Stop reason: HOSPADM

## 2022-06-16 RX ORDER — HEPARIN SODIUM 10000 [USP'U]/100ML
0-3000 INJECTION, SOLUTION INTRAVENOUS CONTINUOUS
Status: DISCONTINUED | OUTPATIENT
Start: 2022-06-17 | End: 2022-06-17

## 2022-06-16 RX ORDER — HEPARIN SODIUM 1000 [USP'U]/ML
4000 INJECTION, SOLUTION INTRAVENOUS; SUBCUTANEOUS ONCE
Status: COMPLETED | OUTPATIENT
Start: 2022-06-17 | End: 2022-06-17

## 2022-06-16 RX ORDER — HYDROCODONE BITARTRATE AND ACETAMINOPHEN 5; 325 MG/1; MG/1
1 TABLET ORAL EVERY 6 HOURS PRN
Status: DISCONTINUED | OUTPATIENT
Start: 2022-06-16 | End: 2022-06-17 | Stop reason: HOSPADM

## 2022-06-16 RX ORDER — NITROGLYCERIN 0.4 MG/1
0.4 TABLET SUBLINGUAL EVERY 5 MIN PRN
Status: DISCONTINUED | OUTPATIENT
Start: 2022-06-16 | End: 2022-06-17 | Stop reason: HOSPADM

## 2022-06-16 RX ORDER — ASPIRIN 81 MG/1
81 TABLET, CHEWABLE ORAL DAILY
Status: DISCONTINUED | OUTPATIENT
Start: 2022-06-17 | End: 2022-06-17 | Stop reason: HOSPADM

## 2022-06-16 RX ADMIN — NITROGLYCERIN 0.4 MG: 0.4 TABLET, ORALLY DISINTEGRATING SUBLINGUAL at 17:48

## 2022-06-16 RX ADMIN — INSULIN LISPRO 2 UNITS: 100 INJECTION, SOLUTION INTRAVENOUS; SUBCUTANEOUS at 22:29

## 2022-06-16 RX ADMIN — HYDROCODONE BITARTRATE AND ACETAMINOPHEN 1 TABLET: 5; 325 TABLET ORAL at 22:28

## 2022-06-16 RX ADMIN — NITROGLYCERIN 0.4 MG: 0.4 TABLET, ORALLY DISINTEGRATING SUBLINGUAL at 18:47

## 2022-06-16 RX ADMIN — MOMETASONE FUROATE AND FORMOTEROL FUMARATE DIHYDRATE 2 PUFF: 200; 5 AEROSOL RESPIRATORY (INHALATION) at 22:21

## 2022-06-16 RX ADMIN — NITROGLYCERIN 0.4 MG: 0.4 TABLET, ORALLY DISINTEGRATING SUBLINGUAL at 18:05

## 2022-06-16 RX ADMIN — INSULIN GLARGINE 60 UNITS: 100 INJECTION, SOLUTION SUBCUTANEOUS at 22:30

## 2022-06-16 RX ADMIN — BUPROPION HYDROCHLORIDE 150 MG: 150 TABLET, EXTENDED RELEASE ORAL at 22:28

## 2022-06-16 RX ADMIN — ASPIRIN 324 MG: 81 TABLET, CHEWABLE ORAL at 17:48

## 2022-06-16 ASSESSMENT — PAIN DESCRIPTION - LOCATION
LOCATION: BACK
LOCATION: CHEST
LOCATION: CHEST

## 2022-06-16 ASSESSMENT — PAIN - FUNCTIONAL ASSESSMENT
PAIN_FUNCTIONAL_ASSESSMENT: 0-10
PAIN_FUNCTIONAL_ASSESSMENT: ACTIVITIES ARE NOT PREVENTED

## 2022-06-16 ASSESSMENT — PAIN DESCRIPTION - DESCRIPTORS: DESCRIPTORS: ACHING;DISCOMFORT

## 2022-06-16 ASSESSMENT — LIFESTYLE VARIABLES
HOW MANY STANDARD DRINKS CONTAINING ALCOHOL DO YOU HAVE ON A TYPICAL DAY: PATIENT DECLINED
HOW OFTEN DO YOU HAVE A DRINK CONTAINING ALCOHOL: NEVER

## 2022-06-16 ASSESSMENT — PAIN SCALES - GENERAL
PAINLEVEL_OUTOF10: 5
PAINLEVEL_OUTOF10: 8
PAINLEVEL_OUTOF10: 4
PAINLEVEL_OUTOF10: 7
PAINLEVEL_OUTOF10: 8

## 2022-06-16 ASSESSMENT — PAIN DESCRIPTION - ORIENTATION
ORIENTATION: LEFT
ORIENTATION: LOWER

## 2022-06-16 NOTE — ED PROVIDER NOTES
[address]  [address] [address]  Dept: [phone]  Loc: [address] [place] [address] COMPLAINT    Chief Complaint   Patient presents with    Chest Pain     Patient arrives via walk in with cp x1 hour. Patient states that she wears 2 L NC chronically. Hx MI       HPI    Lary Abreu is a 62 y.o. female with a history of coronary artery disease, COPD wearing O2 at 2 L per nasal cannula around-the-clock, diabetes, hypertension taking lisinopril, myocardial infarction with a couple of stents with Dr. Satinder Bansal presents to the emergency department with an abrupt onset of mid center chest pressure that started 1 hour prior to ED arrival.  She was at home inside of the house resting when the chest pressure came on. She did not have an increase in her chronic shortness of breath which she is extremely nauseated with a little bit of lightheadedness and a little bit of diaphoresis at the onset. She states her  checked her blood pressure and it was 189/150. She denies headache, blurred vision or double vision, nosebleeds. She currently denies lightheadedness and dizziness. She denies abdominal pain. She takes an aspirin on a daily basis but she did not take it today yet. REVIEW OF SYSTEMS    Cardiac: see HPI, no syncope  Respiratory: no changes in chronicshortness of breath, no cough, no hemoptysis  GI: No vomiting or diarrhea  : No dysuria or hematuria  General: No fever or chills  All other systems reviewed and are negative.     PAST MEDICAL & SURGICAL HISTORY    Past Medical History:   Diagnosis Date    Anxiety     Arthritis     CAD (coronary artery disease)     Cancer ([facility]     cervical cancer - cone biopsy done    COPD (chronic obstructive pulmonary disease) ([facility]     Depression     Diabetes mellitus (HCC)     type II, controlled with pills, not currently on insulin    GERD (gastroesophageal reflux disease)     Hyperlipidemia     Hypertension     Hypothyroidism     Influenza A 14    Movement disorder     muscle spasms    Oxygen deficit     2L continu    Pneumonia     Psychiatric problem     anxiety and depression    Thyroid trouble     Tobacco abuse     Type II or unspecified type diabetes mellitus without mention of complication, not stated as uncontrolled      Past Surgical History:   Procedure Laterality Date    ABDOMEN SURGERY       x 2    CARDIAC SURGERY      stents  and     CERVIX BIOPSY      cone    CHOLECYSTECTOMY      COLONOSCOPY  2018    CORONARY ANGIOPLASTY WITH STENT PLACEMENT      LEEP      abnormal cells (cancerous)       CURRENT MEDICATIONS  (may include discharge medications prescribed in the ED)  Current Outpatient Rx   Medication Sig Dispense Refill    budesonide-formoterol (SYMBICORT) 160-4.5 MCG/ACT AERO INHALE 2 PUFFS BY MOUTH TWICE DAILY 10.2 g 5    fenofibrate (TRICOR) 145 MG tablet TAKE 1 TABLET BY MOUTH DAILY 30 tablet 2    omeprazole (PRILOSEC) 40 MG delayed release capsule TAKE 1 CAPSULE BY MOUTH DAILY 30 capsule 2    sertraline (ZOLOFT) 100 MG tablet TAKE 2 TABLETS BY MOUTH EVERY NIGHT AT BEDTIME 60 tablet 2    diazePAM (VALIUM) 5 MG tablet TAKE 1 TABLET BY MOUTH EVERY 8 HOURS AS NEEDED FOR ANXIETY 90 tablet 0    HYDROcodone-acetaminophen (NORCO) 5-325 MG per tablet Take 1 tablet by mouth 2 times daily as needed for Pain for up to 30 days. 55 tablet 0    lisinopril (PRINIVIL;ZESTRIL) 20 MG tablet TAKE 1 TABLET BY MOUTH DAILY.  30 tablet 5    metFORMIN (GLUCOPHAGE) 1000 MG tablet TAKE 1 TABLET BY MOUTH TWICE DAILY 60 tablet 3    cetirizine (ZYRTEC) 10 MG tablet Take 1 tablet by mouth daily 30 tablet 1    tiotropium (SPIRIVA HANDIHALER) 18 MCG inhalation capsule INHALE THE CONTENTS OF 1 CAPSULE VIA INHALATION DEVICE EVERY DAY 30 capsule 5    LANTUS SOLOSTAR 100 UNIT/ML injection pen ADMINISTER 52 UNITS UNDER THE SKIN EVERY NIGHT 15 mL 2    B-D ULTRAFINE III SHORT PEN 31G X 8 MM MISC USE 1 NEEDLE DAILY 100 each 2    albuterol (PROVENTIL) (2.5 MG/3ML) 0.083% nebulizer solution Take 3 mLs by nebulization every 4 hours as needed for Wheezing 360 mL 11    levothyroxine (SYNTHROID) 25 MCG tablet 1 tab po daily 90 tablet 0    metoprolol succinate (TOPROL XL) 100 MG extended release tablet Take 1 tablet by mouth daily 90 tablet 1    atorvastatin (LIPITOR) 40 MG tablet Take 1 tablet by mouth daily 90 tablet 3    Insulin Syringe-Needle U-100 (INSULIN SYRINGE .5CC/30GX5/16\") 30G X 5/16\" 0.5 ML MISC USE AS DIRECTED THREE TIMES DAILY 100 each 0    albuterol sulfate HFA (PROAIR HFA) 108 (90 Base) MCG/ACT inhaler Inhale 2 puffs into the lungs every 4 hours as needed for Wheezing or Shortness of Breath 1 Inhaler 6    aspirin 81 MG tablet Take 81 mg by mouth daily      ibuprofen (ADVIL;MOTRIN) 200 MG tablet Take 200 mg by mouth every 6 hours as needed for Pain      OXYGEN Inhale into the lungs 2L continious         ALLERGIES    Allergies   Allergen Reactions    Ciprofloxacin Swelling     Pt states throat swells    Sulfa Antibiotics Swelling     Pt states throat swells and she gets rash       SOCIAL & FAMILY HISTORY    Social History     Socioeconomic History    Marital status:      Spouse name: Stephanie Kemp Number of children: 2    Years of education: 12    Highest education level: None   Occupational History    Occupation: disibility for 3 years     Comment: COPD   Tobacco Use    Smoking status: Current Every Day Smoker     Packs/day: 1.00     Years: 43.00     Pack years: 43.00     Types: Cigarettes     Start date: 1/1/1973    Smokeless tobacco: Never Used    Tobacco comment: cessation 2/15, she was cutting down   Vaping Use    Vaping Use: Never used   Substance and Sexual Activity    Alcohol use: No    Drug use: No    Sexual activity: Yes     Partners: Male   Other Topics Concern    None bilaterally, no retractions   Cardiovascular: Regular rhythm and rate, S1-S2. No murmur  Vascular: Radial and DP pulses 2+ and equal bilaterally  GI:  Soft, nontender, normal bowel sounds  Musculoskeletal:  no lower extremity edema, no lower extremity asymmetry, no calf tenderness, no thigh tenderness, no acute deformities  Integument:  Skin warm and dry, no petechiae   Neurologic:  Alert & oriented, no slurred speech  Psych: Pleasant affect, anxious, no hallucinations    EKG    1729 EKG reviewed by myself and interpreted by Dr. Guillermo Sky  Ventricular rate 87 bpm, SC interval 166 ms, QRS duration 90 ms,  ms  No ST elevation or depression. No abnormal T wave inversions noted. Interpretation is normal sinus rhythm. Today's tracing was compared to the one on September 17, 2021 with no significant changes noted. 2005 EKG reviewed by myself. Ventricular rate 79 bpm, SC interval 150 ms, QRS duration 94 ms,  ms  No ST elevation or depression. Interpretation is a normal sinus rhythm. RADIOLOGY/PROCEDURES    XR CHEST (2 VW)   Final Result   No acute cardiopulmonary disease. Labs Reviewed   CBC WITH AUTO DIFFERENTIAL - Abnormal; Notable for the following components:       Result Value    MCV 79.4 (*)     RDW 15.8 (*)     All other components within normal limits   COMPREHENSIVE METABOLIC PANEL - Abnormal; Notable for the following components:    Chloride 98 (*)     Anion Gap 17 (*)     Glucose 143 (*)     BUN 22 (*)     GFR Non- 56 (*)     All other components within normal limits   LIPASE   BRAIN NATRIURETIC PEPTIDE   TROPONIN   TROPONIN       ED COURSE & MEDICAL DECISION MAKING    Pertinent Labs & Imaging studies reviewed and interpreted. (See chart for details)  The patient was immediately placed on the cardiac monitor. IV access obtained. ASA was ordered    See chart for details of medications given during the ED stay.     Vitals:    06/16/22 1732 06/16/22 1845 06/16/22 1930 06/16/22 2015   BP: (!) 170/85 132/72 132/89 (!) 145/69   Pulse: 92 79 83 77   Resp: 18 25 27 22   Temp: 97.7 °F (36.5 °C)      TempSrc: Oral      SpO2: 94% 96% 95% 99%   Weight: 213 lb (96.6 kg)           Medications   nitroGLYCERIN (NITROSTAT) SL tablet 0.4 mg (0.4 mg SubLINGual Given 6/16/22 1847)   aspirin chewable tablet 324 mg (324 mg Oral Given 6/16/22 1748)     This patient was seen and evaluated by myself and my attending physician Dr. Jessica Islas. Differential Diagnosis: Acute Coronary Syndrome, Congestive Heart Failure, Thoracic Dissection, Pericarditis, Pericardial Effusion, Pulmonary Embolism, Pneumonia, Pneumothorax, Ischemic Bowel, Bowel Obstruction, PUD, GERD, Acute Cholecystitis, Pancreatitis, Hepatitis, Colitis, other    She is nontoxic in appearance. On arrival she is afebrile. She is hypertensive with a blood pressure of 170/85. She is actively having chest pain. Labs reveal a white count of 10 with a hemoglobin of 13.1. Sodium is 138 with a potassium of 4.4, chloride 98, CO2 23, BUN 22, creatinine 1, anion gap 17 with a GFR of 56. Serum glucose 143. proBNP 44 with a troponin less than 0.01. LFTs are unremarkable. Lipase 29. Chest x-ray interpreted by radiology reviewed by myself showed no acute cardiopulmonary disease. She has no changes on initial EKG or repeat EKG. She has significant risk factors. She was given aspirin. She was given sublingual nitro that did alleviate her pain taking it down to a 0. It also helped with the blood pressure and at the time of this dictation her pressure is 132/72. I discussed CODE STATUS with this patient. She wants to be a full code. I spoke with Dr. Gregor Rain regarding this patient. She will be admitted to her service. The patient and the granddaughter were updated on the plan of care and they agree.     CRITICAL CARE NOTE:  There was a high probability of clinically significant life-threatening deterioration of the patient's condition requiring my urgent intervention. Total critical care time is 35 minutes. This includes multiple reevaluations, vital sign monitoring, pulse oximetry monitoring, telemetry monitoring, clinical response to the IV medications, reviewing the nursing notes, consultation time, dictation/documentation time, and interpretation of the labwork. (This time excludes time spent performing procedures). FINAL IMPRESSION    1.  Chest pain, unspecified type        PLAN  Admission to the hospital    (Please note that this note was completed with a voice recognition program.  Every attempt was made to edit the dictations, but inevitably there remain words that are mis-transcribed.)        DES Gonsales CNP  06/16/22 2017

## 2022-06-16 NOTE — ED PROVIDER NOTES
I have personally performed a face to face diagnostic evaluation on this patient. I have fully participated in the care of this patient I personally saw the patient and performed a substantive portion of the visit including all aspects of the medical decision making. I have reviewed and agree with all pertinent clinical information including history, physical exam, diagnostic tests, and the plan. HPI: Harsha Plata presented with chest pain for the next hour. Patient wears 2 L nasal cannula at baseline has a history of CAD, TR, COPD, diabetes. Patient has chest pain that is worse with exertion and causing her to sweat. Achy and pressure-like in nature like somebody sitting on her chest.  Feels similar to when she had her last MI. For further history see AUSTYN note. Chief Complaint   Patient presents with    Chest Pain     Patient arrives via walk in with cp x1 hour. Patient states that she wears 2 L NC chronically. Hx MI     Review of Systems: See AUSTYN note  Vital Signs: /72   Pulse 79   Temp 97.7 °F (36.5 °C) (Oral)   Resp 25   Wt 213 lb (96.6 kg)   SpO2 96%   BMI 36.56 kg/m²     Alert 62 y.o. female who does not appear toxic or acutely ill  HENT: Atraumatic, oral mucosa moist  Neck: Grossly normal ROM  Chest/Lungs: respiratory effort normal   Abdomen: Soft nontender  Musculoskeletal: Grossly normal ROM  Skin: No palor or diaphoresis    Medical Decision Making and Plan:  Pertinent Labs & Imaging studies reviewed. (See AUSTYN chart for details)  I agree with AUSTYN assessment and plan. 59-year-old female presents with chest pain with high risk features. EKG without signs of ischemia. Relatively unchanged from previous. Afebrile not tachycardic saturating well on room air. Normotensive. Will obtain second EKG. as well as repeat troponin however patient to be admitted regardless.   See AUSTYN note for further details    EKG Interpretation    Interpreted by emergency department physician    Rhythm: normal sinus   Rate: normal  Axis: normal  Ectopy: none  Conduction: normal  ST Segments: normal  T Waves: normal  Q Waves: none    Clinical Impression: Normal sinus rhythm, normal EKG             Calli Marie MD  06/16/22 Kiesha De La Vega

## 2022-06-17 VITALS
RESPIRATION RATE: 17 BRPM | BODY MASS INDEX: 36.63 KG/M2 | SYSTOLIC BLOOD PRESSURE: 140 MMHG | DIASTOLIC BLOOD PRESSURE: 78 MMHG | TEMPERATURE: 97.5 F | WEIGHT: 213.41 LBS | OXYGEN SATURATION: 97 % | HEART RATE: 68 BPM

## 2022-06-17 LAB
ANION GAP SERPL CALCULATED.3IONS-SCNC: 15 MMOL/L (ref 3–16)
APTT: 27.1 SEC (ref 23–34.3)
APTT: 28.2 SEC (ref 23–34.3)
BUN BLDV-MCNC: 17 MG/DL (ref 7–20)
CALCIUM SERPL-MCNC: 9.1 MG/DL (ref 8.3–10.6)
CHLORIDE BLD-SCNC: 102 MMOL/L (ref 99–110)
CO2: 20 MMOL/L (ref 21–32)
CREAT SERPL-MCNC: 0.7 MG/DL (ref 0.6–1.2)
EKG ATRIAL RATE: 79 BPM
EKG ATRIAL RATE: 87 BPM
EKG DIAGNOSIS: NORMAL
EKG DIAGNOSIS: NORMAL
EKG P AXIS: 59 DEGREES
EKG P AXIS: 69 DEGREES
EKG P-R INTERVAL: 150 MS
EKG P-R INTERVAL: 166 MS
EKG Q-T INTERVAL: 376 MS
EKG Q-T INTERVAL: 410 MS
EKG QRS DURATION: 90 MS
EKG QRS DURATION: 94 MS
EKG QTC CALCULATION (BAZETT): 452 MS
EKG QTC CALCULATION (BAZETT): 470 MS
EKG R AXIS: 0 DEGREES
EKG R AXIS: 7 DEGREES
EKG T AXIS: 28 DEGREES
EKG T AXIS: 65 DEGREES
EKG VENTRICULAR RATE: 79 BPM
EKG VENTRICULAR RATE: 87 BPM
GFR AFRICAN AMERICAN: >60
GFR NON-AFRICAN AMERICAN: >60
GLUCOSE BLD-MCNC: 141 MG/DL (ref 70–99)
GLUCOSE BLD-MCNC: 143 MG/DL (ref 70–99)
GLUCOSE BLD-MCNC: 147 MG/DL (ref 70–99)
GLUCOSE BLD-MCNC: 192 MG/DL (ref 70–99)
PERFORMED ON: ABNORMAL
POC ACT LR: 235 SEC
POTASSIUM SERPL-SCNC: 4.4 MMOL/L (ref 3.5–5.1)
SODIUM BLD-SCNC: 137 MMOL/L (ref 136–145)
TROPONIN: 0.26 NG/ML

## 2022-06-17 PROCEDURE — 6370000000 HC RX 637 (ALT 250 FOR IP): Performed by: INTERNAL MEDICINE

## 2022-06-17 PROCEDURE — 2709999900 HC NON-CHARGEABLE SUPPLY

## 2022-06-17 PROCEDURE — C1887 CATHETER, GUIDING: HCPCS

## 2022-06-17 PROCEDURE — 85730 THROMBOPLASTIN TIME PARTIAL: CPT

## 2022-06-17 PROCEDURE — 93454 CORONARY ARTERY ANGIO S&I: CPT

## 2022-06-17 PROCEDURE — 6360000002 HC RX W HCPCS: Performed by: INTERNAL MEDICINE

## 2022-06-17 PROCEDURE — 2500000003 HC RX 250 WO HCPCS

## 2022-06-17 PROCEDURE — 93454 CORONARY ARTERY ANGIO S&I: CPT | Performed by: INTERNAL MEDICINE

## 2022-06-17 PROCEDURE — 80048 BASIC METABOLIC PNL TOTAL CA: CPT

## 2022-06-17 PROCEDURE — 99152 MOD SED SAME PHYS/QHP 5/>YRS: CPT

## 2022-06-17 PROCEDURE — 2700000000 HC OXYGEN THERAPY PER DAY

## 2022-06-17 PROCEDURE — 92941 PRQ TRLML REVSC TOT OCCL AMI: CPT | Performed by: INTERNAL MEDICINE

## 2022-06-17 PROCEDURE — 2500000003 HC RX 250 WO HCPCS: Performed by: INTERNAL MEDICINE

## 2022-06-17 PROCEDURE — C1894 INTRO/SHEATH, NON-LASER: HCPCS

## 2022-06-17 PROCEDURE — C1725 CATH, TRANSLUMIN NON-LASER: HCPCS

## 2022-06-17 PROCEDURE — C1769 GUIDE WIRE: HCPCS

## 2022-06-17 PROCEDURE — 99152 MOD SED SAME PHYS/QHP 5/>YRS: CPT | Performed by: INTERNAL MEDICINE

## 2022-06-17 PROCEDURE — 93010 ELECTROCARDIOGRAM REPORT: CPT | Performed by: INTERNAL MEDICINE

## 2022-06-17 PROCEDURE — 36415 COLL VENOUS BLD VENIPUNCTURE: CPT

## 2022-06-17 PROCEDURE — 94660 CPAP INITIATION&MGMT: CPT

## 2022-06-17 PROCEDURE — 94640 AIRWAY INHALATION TREATMENT: CPT

## 2022-06-17 PROCEDURE — 94760 N-INVAS EAR/PLS OXIMETRY 1: CPT

## 2022-06-17 PROCEDURE — 99153 MOD SED SAME PHYS/QHP EA: CPT

## 2022-06-17 PROCEDURE — 85347 COAGULATION TIME ACTIVATED: CPT

## 2022-06-17 PROCEDURE — B2111ZZ FLUOROSCOPY OF MULTIPLE CORONARY ARTERIES USING LOW OSMOLAR CONTRAST: ICD-10-PCS | Performed by: INTERNAL MEDICINE

## 2022-06-17 PROCEDURE — 92920 PRQ TRLUML C ANGIOP 1ART&/BR: CPT

## 2022-06-17 PROCEDURE — 99222 1ST HOSP IP/OBS MODERATE 55: CPT | Performed by: INTERNAL MEDICINE

## 2022-06-17 PROCEDURE — 6360000004 HC RX CONTRAST MEDICATION: Performed by: INTERNAL MEDICINE

## 2022-06-17 PROCEDURE — 84484 ASSAY OF TROPONIN QUANT: CPT

## 2022-06-17 PROCEDURE — 6370000000 HC RX 637 (ALT 250 FOR IP)

## 2022-06-17 PROCEDURE — 6360000002 HC RX W HCPCS

## 2022-06-17 PROCEDURE — 02703ZZ DILATION OF CORONARY ARTERY, ONE ARTERY, PERCUTANEOUS APPROACH: ICD-10-PCS | Performed by: INTERNAL MEDICINE

## 2022-06-17 RX ORDER — HEPARIN SODIUM 1000 [USP'U]/ML
4000 INJECTION, SOLUTION INTRAVENOUS; SUBCUTANEOUS ONCE
Status: DISCONTINUED | OUTPATIENT
Start: 2022-06-17 | End: 2022-06-17 | Stop reason: HOSPADM

## 2022-06-17 RX ORDER — SODIUM CHLORIDE 0.9 % (FLUSH) 0.9 %
5-40 SYRINGE (ML) INJECTION PRN
Status: DISCONTINUED | OUTPATIENT
Start: 2022-06-17 | End: 2022-06-17 | Stop reason: HOSPADM

## 2022-06-17 RX ORDER — ACETAMINOPHEN 325 MG/1
650 TABLET ORAL EVERY 4 HOURS PRN
Status: DISCONTINUED | OUTPATIENT
Start: 2022-06-17 | End: 2022-06-17 | Stop reason: HOSPADM

## 2022-06-17 RX ORDER — SODIUM CHLORIDE 0.9 % (FLUSH) 0.9 %
5-40 SYRINGE (ML) INJECTION EVERY 12 HOURS SCHEDULED
Status: DISCONTINUED | OUTPATIENT
Start: 2022-06-17 | End: 2022-06-17 | Stop reason: HOSPADM

## 2022-06-17 RX ORDER — SODIUM CHLORIDE 9 MG/ML
INJECTION, SOLUTION INTRAVENOUS PRN
Status: DISCONTINUED | OUTPATIENT
Start: 2022-06-17 | End: 2022-06-17 | Stop reason: HOSPADM

## 2022-06-17 RX ORDER — EPTIFIBATIDE 0.75 MG/ML
2 INJECTION, SOLUTION INTRAVENOUS CONTINUOUS
Status: DISCONTINUED | OUTPATIENT
Start: 2022-06-17 | End: 2022-06-17

## 2022-06-17 RX ADMIN — NITROGLYCERIN 5 MCG/MIN: 20 INJECTION INTRAVENOUS at 03:04

## 2022-06-17 RX ADMIN — HYDROCODONE BITARTRATE AND ACETAMINOPHEN 1 TABLET: 5; 325 TABLET ORAL at 14:46

## 2022-06-17 RX ADMIN — LEVOTHYROXINE SODIUM 25 MCG: 0.03 TABLET ORAL at 08:46

## 2022-06-17 RX ADMIN — ACETAMINOPHEN 650 MG: 325 TABLET ORAL at 13:00

## 2022-06-17 RX ADMIN — FENOFIBRATE 160 MG: 160 TABLET ORAL at 08:47

## 2022-06-17 RX ADMIN — IPRATROPIUM BROMIDE AND ALBUTEROL SULFATE 1 AMPULE: .5; 3 SOLUTION RESPIRATORY (INHALATION) at 15:28

## 2022-06-17 RX ADMIN — IOPAMIDOL 85 ML: 755 INJECTION, SOLUTION INTRAVENOUS at 12:44

## 2022-06-17 RX ADMIN — INSULIN LISPRO 1 UNITS: 100 INJECTION, SOLUTION INTRAVENOUS; SUBCUTANEOUS at 08:48

## 2022-06-17 RX ADMIN — ATORVASTATIN CALCIUM 40 MG: 40 TABLET, FILM COATED ORAL at 08:47

## 2022-06-17 RX ADMIN — INSULIN LISPRO 1 UNITS: 100 INJECTION, SOLUTION INTRAVENOUS; SUBCUTANEOUS at 13:11

## 2022-06-17 RX ADMIN — HYDROCODONE BITARTRATE AND ACETAMINOPHEN 1 TABLET: 5; 325 TABLET ORAL at 08:47

## 2022-06-17 RX ADMIN — LISINOPRIL 20 MG: 20 TABLET ORAL at 08:47

## 2022-06-17 RX ADMIN — METOPROLOL SUCCINATE 100 MG: 50 TABLET, EXTENDED RELEASE ORAL at 08:47

## 2022-06-17 RX ADMIN — PANTOPRAZOLE SODIUM 40 MG: 40 TABLET, DELAYED RELEASE ORAL at 08:46

## 2022-06-17 RX ADMIN — NITROGLYCERIN 20 MCG/MIN: 20 INJECTION INTRAVENOUS at 09:39

## 2022-06-17 RX ADMIN — METFORMIN HYDROCHLORIDE 1000 MG: 500 TABLET ORAL at 08:46

## 2022-06-17 RX ADMIN — DIAZEPAM 5 MG: 5 TABLET ORAL at 10:26

## 2022-06-17 RX ADMIN — DIAZEPAM 5 MG: 5 TABLET ORAL at 00:10

## 2022-06-17 RX ADMIN — HEPARIN SODIUM 4000 UNITS: 1000 INJECTION INTRAVENOUS; SUBCUTANEOUS at 00:06

## 2022-06-17 RX ADMIN — HEPARIN SODIUM 1000 UNITS/HR: 10000 INJECTION, SOLUTION INTRAVENOUS at 00:06

## 2022-06-17 RX ADMIN — EPTIFIBATIDE 2 MCG/KG/MIN: 0.75 INJECTION INTRAVENOUS at 09:33

## 2022-06-17 RX ADMIN — SERTRALINE 200 MG: 100 TABLET, FILM COATED ORAL at 08:47

## 2022-06-17 RX ADMIN — IPRATROPIUM BROMIDE AND ALBUTEROL SULFATE 1 AMPULE: .5; 3 SOLUTION RESPIRATORY (INHALATION) at 07:45

## 2022-06-17 RX ADMIN — ASPIRIN 81 MG: 81 TABLET, CHEWABLE ORAL at 08:47

## 2022-06-17 RX ADMIN — MOMETASONE FUROATE AND FORMOTEROL FUMARATE DIHYDRATE 2 PUFF: 200; 5 AEROSOL RESPIRATORY (INHALATION) at 07:45

## 2022-06-17 ASSESSMENT — PAIN DESCRIPTION - DESCRIPTORS
DESCRIPTORS: ACHING
DESCRIPTORS: DISCOMFORT
DESCRIPTORS: ACHING
DESCRIPTORS: PRESSURE;DISCOMFORT

## 2022-06-17 ASSESSMENT — PAIN DESCRIPTION - ORIENTATION
ORIENTATION: MID
ORIENTATION: MID;LEFT
ORIENTATION: ANTERIOR
ORIENTATION: LOWER

## 2022-06-17 ASSESSMENT — PAIN DESCRIPTION - PAIN TYPE
TYPE: ACUTE PAIN

## 2022-06-17 ASSESSMENT — PAIN DESCRIPTION - FREQUENCY
FREQUENCY: CONTINUOUS

## 2022-06-17 ASSESSMENT — PAIN DESCRIPTION - LOCATION
LOCATION: CHEST
LOCATION: BACK
LOCATION: HEAD
LOCATION: CHEST

## 2022-06-17 ASSESSMENT — PAIN SCALES - GENERAL
PAINLEVEL_OUTOF10: 4
PAINLEVEL_OUTOF10: 8
PAINLEVEL_OUTOF10: 9
PAINLEVEL_OUTOF10: 4

## 2022-06-17 ASSESSMENT — PAIN - FUNCTIONAL ASSESSMENT
PAIN_FUNCTIONAL_ASSESSMENT: PREVENTS OR INTERFERES SOME ACTIVE ACTIVITIES AND ADLS
PAIN_FUNCTIONAL_ASSESSMENT: PREVENTS OR INTERFERES SOME ACTIVE ACTIVITIES AND ADLS
PAIN_FUNCTIONAL_ASSESSMENT: ACTIVITIES ARE NOT PREVENTED

## 2022-06-17 ASSESSMENT — PAIN DESCRIPTION - ONSET
ONSET: PROGRESSIVE
ONSET: GRADUAL
ONSET: ON-GOING

## 2022-06-17 NOTE — ACP (ADVANCE CARE PLANNING)
Advance Care Planning     Advance Care Planning Activator (Inpatient)  Conversation Note      Date of ACP Conversation: 6/17/2022     Conversation Conducted with:   Damon Ziegler. Mariano Vaughan    ACP Activator: [address] [place] Decision Maker:     Current Designated Health Care Decision Maker:          Margarette Bobo, spouse Primary    [phone]         Jj Maki, Daughter  [phone]    Click here to complete Healthcare Decision Makers including section of the Healthcare Decision Maker Relationship (ie \"Primary\")      Care Preferences    Ventilation: \"If you were in your present state of health and suddenly became very ill and were unable to breathe on your own, what would your preference be about the use of a ventilator (breathing machine) if it were available to you? \"      Would the patient desire the use of ventilator (breathing machine)?:    \"Yes\"    \"If your health worsens and it becomes clear that your chance of recovery is unlikely, what would your preference be about the use of a ventilator (breathing machine) if it were available to you? \"     Would the patient desire the use of ventilator (breathing machine)?:    \"NO\"      Resuscitation  \"CPR works best to restart the heart when there is a sudden event, like a heart attack, in someone who is otherwise healthy. Unfortunately, CPR does not typically restart the heart for people who have serious health conditions or who are very sick. \"    \"In the event your heart stopped as a result of an underlying serious health condition, would you want attempts to be made to restart your heart (answer \"yes\" for attempt to resuscitate) or would you prefer a natural death (answer \"no\" for do not attempt to resuscitate)? \"   \"Yes\"       [] Yes   [] No   Educated Patient / Peterson Daigle regarding differences between Advance Directives and portable DNR orders.     Length of ACP Conversation in minutes:  3 minutes    Conversation Outcomes:  [x] ACP discussion completed  [] Existing advance directive reviewed with patient; no changes to patient's previously recorded wishes  [] New Advance Directive completed  [] Portable Do Not Rescitate prepared for Provider review and signature  [] POLST/POST/MOLST/MOST prepared for Provider review and signature      Follow-up plan:    [x] Schedule follow-up conversation to continue planning  [] Referred individual to Provider for additional questions/concerns   [] Advised patient/agent/surrogate to review completed ACP document and update if needed with changes in condition, patient preferences or care setting    [x] This note routed to one or more involved healthcare providers      Patient is interested in having the Ron[facility] Quinones before she goes home. Referral made to Spiritual Care Team.    Routed to Dr Sandoval Noriega.     [place], [place]     Case Management   [phone]    6/17/2022  1:58 PM

## 2022-06-17 NOTE — CARE COORDINATION
06/17/22 1358   Service Assessment   Patient Orientation Alert and Oriented   Cognition Alert   History Provided By Patient   Primary Caregiver Self   Support Systems Spouse/Significant Other   Patient's Healthcare Decision Maker is: Legal Next of Kin  (Spouse and then dgtr, Sheeba Cosme)   PCP Verified by CM Yes  (Mauricio Frias)   Last Visit to PCP Within last 3 months   Prior Functional Level Independent in ADLs/IADLs   Current Functional Level Independent in ADLs/IADLs   Can patient return to prior living arrangement Yes   Ability to make needs known: Good   Family able to assist with home care needs: Yes   Financial Resources Medicaid; Medicare   Social/Functional History   Lives With Spouse   Type of [address] level   Home Access Stairs to enter with rails   Entrance Stairs - Number of Steps 4 steps with railing   Home Equipment Cane;Oxygen  (bi pap, cane, oxygen)   ADL Assistance Independent   Homemaking Assistance Independent   Homemaking Responsibilities Yes   Ambulation Assistance Independent   Transfer Assistance Independent   Active  Yes   Mode of Transportation Car   Discharge Planning   Living Arrangements Spouse/Significant Other   Current Services Prior To Admission Home Bipap; Oxygen Therapy   Potential Assistance Purchasing Medications No   Patient expects to be discharged to: House   One/Two Story Residence One story   Lift Chair Available No   History of falls? 0   Services At/After Discharge   [employer] Information Provided?  No

## 2022-06-17 NOTE — ED NOTES
Report given to [facility], RN. Pt can go to floor.       Emeka Kirk RN  06/16/22 2100

## 2022-06-17 NOTE — CARE COORDINATION
INITIAL CASE MANAGEMENT ASSESSMENT    Reviewed chart, met with patient to assess possible discharge needs. Explained Case Management role/services. Living Situation:   Lives with spouse, house with 4 steps with railing to enter one floor living. ADLs:   Totally independent     DME:  Oxygen, bipap, cane    PT/OT Recs:    TBD     Active Services:   none     Transportation:  family     Medications:    [facility] [address]  PCP:   Dr Godwin Mitchell      HD/PD:  none    PLAN/COMMENTS:   Goal is home. No expressed concerns for DC. Referral made to spiritual care team for Adv Directive Forms.     Lamonte Jovel [place]     Case Management   [phone]    6/17/2022  2:03 PM

## 2022-06-17 NOTE — CONSULTS
Cardiology Consultation     Marry Weinstein  1960    PCP: Freddi Skiff, MD  Referring Physician: Dr Saúl Ledesma   Reason for Referral: Chest pain  Chief Complaint:   Chief Complaint   Patient presents with    Chest Pain     Patient arrives via walk in with cp x1 hour. Patient states that she wears 2 L NC chronically. Hx MI       Subjective:     History of Present Illness: The patient is 62 y.o. female with a past medical history significant for coronary artery disease diabetes hypertension hyperlipidemia presents with the above complaint. She admits to chest pain while walking. Her pain is relieved at rest he is currently asymptomatic and denies chest pain PND orthopnea palpitations syncope or edema.   Work-up in the [facility] [place] emergency room was consistent with elevated cardiac markers interventional cardiology has been consulted          Past Medical History:   Diagnosis Date    Anxiety     Arthritis     CAD (coronary artery disease)     Cancer ([facility] [address]     cervical cancer - cone biopsy done    COPD (chronic obstructive pulmonary disease) (HCC)     Depression     Diabetes mellitus ([address]     type II, controlled with pills, not currently on insulin    GERD (gastroesophageal reflux disease)     Hyperlipidemia     Hypertension     Hypothyroidism     Influenza A 14    Movement disorder     muscle spasms    Oxygen deficit     2L continu    Pneumonia     Psychiatric problem     anxiety and depression    Thyroid trouble     Tobacco abuse     Type II or unspecified type diabetes mellitus without mention of complication, not stated as uncontrolled      Past Surgical History:   Procedure Laterality Date    ABDOMEN SURGERY       x 2    CARDIAC SURGERY      stents  and     CERVIX BIOPSY      cone    CHOLECYSTECTOMY      COLONOSCOPY  2018    CORONARY ANGIOPLASTY WITH STENT PLACEMENT      LEEP      abnormal cells (cancerous)     Family History   Problem Relation Age of Onset    Cancer Mother         lung    Heart Disease Mother     Heart Disease Father         MI    Diabetes Father     High Blood Pressure Sister     Heart Disease Brother         multiple heart attacks    Other Brother         diverticulitis     Social History     Tobacco Use    Smoking status: Current Every Day Smoker     Packs/day: 1.00     Years: 43.00     Pack years: 43.00     Types: Cigarettes     Start date: 1/1/1973    Smokeless tobacco: Never Used    Tobacco comment: cessation 2/15, she was cutting down   Vaping Use    Vaping Use: Never used   Substance Use Topics    Alcohol use: No    Drug use: No       Allergies   Allergen Reactions    Ciprofloxacin Swelling     Pt states throat swells    Sulfa Antibiotics Swelling     Pt states throat swells and she gets rash     Current Facility-Administered Medications   Medication Dose Route Frequency Provider Last Rate Last Admin    eptifibatide (INTEGRILIN) 0.75 mg/mL infusion  2 mcg/kg/min IntraVENous Continuous Phuc Pineda MD        nitroGLYCERIN (NITROSTAT) SL tablet 0.4 mg  0.4 mg SubLINGual Q5 Min PRN Daisy Newer, APRN - CNP   0.4 mg at 06/16/22 1847    aspirin chewable tablet 81 mg  81 mg Oral Daily Yamilet Beach MD   81 mg at 06/17/22 0847    atorvastatin (LIPITOR) tablet 40 mg  40 mg Oral Daily Yamilet Beach MD   40 mg at 06/17/22 0847    mometasone-formoterol (DULERA) 200-5 MCG/ACT inhaler 2 puff  2 puff Inhalation BID Jolie Coleman MD   2 puff at 06/17/22 0745    buPROPion (WELLBUTRIN XL) extended release tablet 150 mg  150 mg Oral QPM Jolie Coleman MD   150 mg at 06/16/22 2228    diazePAM (VALIUM) tablet 5 mg  5 mg Oral Q8H PRN Yamilet Beach MD   5 mg at 06/17/22 0010    fenofibrate (TRIGLIDE) tablet 160 mg  160 mg Oral Daily Jolie Coleman MD   160 mg at 06/17/22 0847    HYDROcodone-acetaminophen (NORCO) 5-325 MG per tablet 1 tablet  1 tablet Oral Q6H PRN Jolie Coleman MD   1 tablet at 06/17/22 6886 insulin glargine (LANTUS) injection vial 60 Units  60 Units SubCUTAneous Nightly Justen Giron MD   60 Units at 06/16/22 2230    levothyroxine (SYNTHROID) tablet 25 mcg  25 mcg Oral Daily Justen Giron MD   25 mcg at 06/17/22 0846    lisinopril (PRINIVIL;ZESTRIL) tablet 20 mg  20 mg Oral Daily uJsten Giron MD   20 mg at 06/17/22 0847    metFORMIN (GLUCOPHAGE) tablet 1,000 mg  1,000 mg Oral BID  Justen Giron MD   1,000 mg at 06/17/22 0846    metoprolol succinate (TOPROL XL) extended release tablet 100 mg  100 mg Oral Daily Justen Giron MD   100 mg at 06/17/22 0847    sertraline (ZOLOFT) tablet 200 mg  200 mg Oral QAM Justen Giron MD   200 mg at 06/17/22 0847    ipratropium-albuterol (DUONEB) nebulizer solution 1 ampule  1 ampule Inhalation Q4H WA Justen Giron MD   1 ampule at 06/17/22 0745    pantoprazole (PROTONIX) tablet 40 mg  40 mg Oral QAM  Justen Giron MD   40 mg at 06/17/22 0846    insulin lispro (HUMALOG) injection vial 0-6 Units  0-6 Units SubCUTAneous TID  Justen Giron MD   1 Units at 06/17/22 0848    insulin lispro (HUMALOG) injection vial 0-3 Units  0-3 Units SubCUTAneous Nightly Justen Giron MD   2 Units at 06/16/22 2229    glucose chewable tablet 16 g  4 tablet Oral PRN Justen Giron MD        dextrose bolus 10% 125 mL  125 mL IntraVENous PRN Justen Giron MD        Or    dextrose bolus 10% 250 mL  250 mL IntraVENous PRN Justen Giron MD        glucagon (rDNA) injection 1 mg  1 mg IntraMUSCular PRN Justen Giron MD        dextrose 5 % solution  100 mL/hr IntraVENous PRN Justen Giron MD        heparin 25,000 unit in sodium chloride 0.45% 250 mL (premix) infusion  0-3,000 Units/hr IntraVENous Continuous Justen Giron MD 10 mL/hr at 06/17/22 0006 1,000 Units/hr at 06/17/22 0006    nitroGLYCERIN 50 mg in dextrose 5% 250 mL infusion  5-200 mcg/min IntraVENous Continuous Roman Boston MD 4.5 mL/hr at 06/17/22 3685 15 mcg/min at 06/17/22 0516       Review of Systems:  Constitutional: No unanticipated weight loss. There's been no change in energy level, sleep pattern, or activity level. No fevers, chills. Eyes: No visual changes or diplopia. No scleral icterus. ENT: No Headaches, hearing loss or vertigo. No mouth sores or sore throat. Cardiovascular: as reviewed in HPI  Respiratory: No cough or wheezing, no sputum production. No hemoptysis. Gastrointestinal: No abdominal pain, appetite loss, blood in stools. No change in bowel or bladder habits. Genitourinary: No dysuria, trouble voiding, or hematuria. Musculoskeletal:  No gait disturbance, no joint complaints. Integumentary: No rash or pruritis. Neurological: No headache, diplopia, change in muscle strength, numbness or tingling. Psychiatric: No anxiety or depression. Endocrine: No temperature intolerance. No excessive thirst, fluid intake, or urination. No tremor. Hematologic/Lymphatic: No abnormal bruising or bleeding, blood clots or swollen lymph nodes. Allergic/Immunologic: No nasal congestion or hives. Physical Exam:   BP (!) 127/107   Pulse 83   Temp 98.5 °F (36.9 °C) (Oral)   Resp 21   Wt 213 lb 6.5 oz (96.8 kg)   SpO2 93%   BMI 36.63 kg/m²   Wt Readings from Last 3 Encounters:   06/17/22 213 lb 6.5 oz (96.8 kg)   06/13/22 213 lb (96.6 kg)   03/02/22 214 lb (97.1 kg)     Constitutional: She is oriented to person, place, and time. She appears well-developed and well-nourished. In no acute distress. Head: Normocephalic and atraumatic. Pupils equal and round. Neck: Neck supple. No JVP or carotid bruit appreciated. No mass and no thyromegaly present. No lymphadenopathy present. Cardiovascular: Normal rate. Normal heart sounds. Exam reveals no gallop and no friction rub. No murmur heard. Pulmonary/Chest: Effort normal and breath sounds normal. No respiratory distress. She has no wheezes, rhonchi or rales. Abdominal: Soft, non-tender.  Bowel sounds are normal. She exhibits no organomegaly, mass or bruit. Extremities: No edema. No cyanosis or clubbing. Pulses are 2+ radial/carotid bilaterally. Neurological: No gross cranial nerve deficit. Coordination normal.   Skin: Skin is warm and dry. There is no rash or diaphoresis. Psychiatric: She has a normal mood and affect. Her speech is normal and behavior is normal.     Lab Review:   FLP:    Lab Results   Component Value Date    TRIG 288 03/02/2022    HDL 33 03/02/2022    LDLCALC 83 03/02/2022    LDLDIRECT 136 10/03/2018    LABVLDL 48 03/02/2022     BUN/Creatinine:    Lab Results   Component Value Date    BUN 22 06/16/2022    CREATININE 1.0 06/16/2022     PT/INR, TNI, HGB A1C:   Lab Results   Component Value Date/Time    TROPONINI 0.26 (H) 06/17/2022 03:55 AM    LABA1C 7.4 (H) 06/13/2022 05:46 PM      No results found for: CBCAUTODIF    EKG Interpretation: Normal sinus rhythm nonspecific ST-T wave abnormalities    Echo:     Stress Test:     Cath:     CT:    Doppler:     All above diagnostic testing and laboratory data was independently visualized and reviewed by me (not simply review of report)       Assessment and Plan   1) non-ST elevation myocardial infarction  -Recommend coronary angiogram  I had a detail discussion with the patient and the family members regarding the risk and benefit of the procedures. I explained to them the details of the procedure. I explained to them that the procedure will be performed using moderate sedation and local anaesthesia using lidocaine. I explained any risk of bleeding, perforation of the vessel, stroke, myocardial infarction or death. I have presented reasonable alternatives to the patient's proposed care, treatment, and services. The discussion I have done encompassed risks, benefits, and side effects related to the alternatives and the risks related to not receiving the proposed care, treatment, and services.      The patient and the family members understood the risk and benefits and the details of procedure and would like to go ahead with the procedure. The patient gave informed consent. Overall, the problems requiring hospitalization are high in severity     Thank you very much for allowing me to participate in the care of your patient. Please do not hesitate to contact me if you have any questions.       Graciela Melgar MD [id number], Interventional Cardiology, and Peripheral Vascular Disease   [facility], [place], operated by [facility]   Ph: [phone]  Fax: [phone]

## 2022-06-17 NOTE — H&P
Hospital Medicine History & Physical    Patient:  Dee Bartholomew  YOB: 1960  Date of Service: 22  MRN: [id number]    PCP: Tran Batres MD    Date of Admission: 2022      Chief Complaint:    Chief Complaint   Patient presents with    Chest Pain     Patient arrives via walk in with cp x1 hour. Patient states that she wears 2 L NC chronically. Hx MI         History Of Present Illness:     The patient is a 62 y.o. female who presents to [facility] with c/o as above    Past Medical History:        Diagnosis Date    Anxiety     Arthritis     CAD (coronary artery disease)     Cancer ([facility] [address]     cervical cancer - cone biopsy done    COPD (chronic obstructive pulmonary disease) (HCC)     Depression     Di[facility]     type II, controlled with pills, not currently on insulin    GERD (gastroesophageal reflux disease)     Hyperlipidemia     Hypertension     Hypothyroidism     Influenza A 14    Movement disorder     muscle spasms    Oxygen deficit     2L continu    Pneumonia     Psychiatric problem     anxiety and depression    Thyroid trouble     Tobacco abuse     Type II or unspecified type diabetes mellitus without mention of complication, not stated as uncontrolled        Past Surgical History:        Procedure Laterality Date    ABDOMEN SURGERY       x 2    CARDIAC SURGERY      stents  and     CERVIX BIOPSY      cone    CHOLECYSTECTOMY      COLONOSCOPY  2018    CORONARY ANGIOPLASTY WITH STENT PLACEMENT      LEEP      abnormal cells (cancerous)       Family History:  Family History   Problem Relation Age of Onset    Cancer Mother         lung    Heart Disease Mother     Heart Disease Father         MI    Diabetes Father     High Blood Pressure Sister     Heart Disease Brother         multiple heart attacks Other Brother         diverticulitis       Medications Prior to Admission:    Prior to Admission medications    Medication Sig Start Date End Date Taking? Authorizing Provider   buPROPion (WELLBUTRIN XL) 150 MG extended release tablet Take 150 mg by mouth every evening   Yes Historical Provider, MD   insulin aspart (NOVOLOG FLEXPEN) 100 UNIT/ML injection pen Inject 8 Units into the skin 3 times daily (before meals) Along with sliding scale   Yes Historical Provider, MD   insulin glargine (LANTUS) 100 UNIT/ML injection vial Inject 60 Units into the skin nightly   Yes Historical Provider, MD   sertraline (ZOLOFT) 100 MG tablet Take 200 mg by mouth every morning   Yes Historical Provider, MD   budesonide-formoterol (SYMBICORT) 160-4.5 MCG/ACT AERO INHALE 2 PUFFS BY MOUTH TWICE DAILY 3/10/22   Justen Giron MD   fenofibrate (TRICOR) 145 MG tablet TAKE 1 TABLET BY MOUTH DAILY 6/13/22   Justen Giron MD   omeprazole (PRILOSEC) 40 MG delayed release capsule TAKE 1 CAPSULE BY MOUTH DAILY 6/13/22   Justen Giron MD   diazePAM (VALIUM) 5 MG tablet TAKE 1 TABLET BY MOUTH EVERY 8 HOURS AS NEEDED FOR ANXIETY 6/2/22 7/2/22  Justen Giron MD   HYDROcodone-acetaminophen (NORCO) 5-325 MG per tablet Take 1 tablet by mouth 2 times daily as needed for Pain for up to 30 days. 5/23/22 6/22/22  Justen Giron MD   lisinopril (PRINIVIL;ZESTRIL) 20 MG tablet TAKE 1 TABLET BY MOUTH DAILY.  5/16/22   Justen Giron MD   metFORMIN (GLUCOPHAGE) 1000 MG tablet TAKE 1 TABLET BY MOUTH TWICE DAILY 4/8/22   Justen Giron MD   cetirizine (ZYRTEC) 10 MG tablet Take 1 tablet by mouth daily 4/7/22   Justen Giron MD   tiotropium (SPIRIVA HANDIHALER) 18 MCG inhalation capsule INHALE THE CONTENTS OF 1 CAPSULE VIA INHALATION DEVICE EVERY DAY 4/6/22   Justen Giron MD   B-D ULTRAFINE III SHORT PEN 31G X 8 MM MISC USE 1 NEEDLE DAILY 2/10/22   Yamilet Beach MD   albuterol (PROVENTIL) (2.5 MG/3ML) 0.083% nebulizer solution Take 3 mLs by nebulization every 4 hours as needed for Wheezing 1/17/22   Allison Rose MD   levothyroxine (SYNTHROID) 25 MCG tablet 1 tab po daily 1/11/22   Allison Rose MD   metoprolol succinate (TOPROL XL) 100 MG extended release tablet Take 1 tablet by mouth daily 1/11/22   Allison Rose MD   atorvastatin (LIPITOR) 40 MG tablet Take 1 tablet by mouth daily 9/17/21   Mary Grace Murphy MD   Insulin Syringe-Needle U-100 (INSULIN SYRINGE .5CC/30GX5/16\") 30G X 5/16\" 0.5 ML MISC USE AS DIRECTED THREE TIMES DAILY 8/21/19   Allison Rose MD   albuterol sulfate HFA (PROAIR HFA) 108 (90 Base) MCG/ACT inhaler Inhale 2 puffs into the lungs every 4 hours as needed for Wheezing or Shortness of Breath 2/5/19   Lawerence Charmaine, DO   aspirin 81 MG tablet Take 81 mg by mouth daily    Historical Provider, MD   ibuprofen (ADVIL;MOTRIN) 200 MG tablet Take 200 mg by mouth every 6 hours as needed for Pain    Historical Provider, MD   OXYGEN Inhale into the lungs 2L continious    Historical Provider, MD       Allergies:  Ciprofloxacin and Sulfa antibiotics    Social History:    Social History     Socioeconomic History    Marital status:      Spouse name: Tran Kelly    Number of children: 2    Years of education: 12    Highest education level: Not on file   Occupational History    Occupation: disibility for 3 years     Comment: COPD   Tobacco Use    Smoking status: Current Every Day Smoker     Packs/day: 1.00     Years: 43.00     Pack years: 43.00     Types: Cigarettes     Start date: 1/1/1973    Smokeless tobacco: Never Used    Tobacco comment: cessation 2/15, she was cutting down   Vaping Use    Vaping Use: Never used   Substance and Sexual Activity    Alcohol use: No    Drug use: No    Sexual activity: Yes     Partners: Male   Other Topics Concern    Not on file   Social History Narrative    Not on file     Social Determinants of Health     Financial Resource Strain:     Difficulty of Paying Living Expenses: Not on file   Food Insecurity:     Worried About [employer] in the Last Year: Not on file    [place] of Food in the Last Year: Not on file   Transportation Needs:     Lack of Transportation (Medical): Not on file    Lack of Transportation (Non-Medical): Not on file   Physical Activity:     Days of Exercise per Week: Not on file    Minutes of Exercise per Session: Not on file   Stress:     Feeling of Stress : Not on file   Social Connections:     Frequency of Communication with Friends and Family: Not on file    Frequency of Social Gatherings with Friends and Family: Not on file    Attends Jainism Services: Not on file    Active Member of Clubs or Organizations: Not on file    Attends Club or Organization Meetings: Not on file    Marital Status: Not on file   Intimate Partner Violence:     Fear of Current or Ex-Partner: Not on file    Emotionally Abused: Not on file    Physically Abused: Not on file    Sexually Abused: Not on file   Housing Stability:     Unable to Pay for Housing in the Last Year: Not on file    Number of [place] in the Last Year: Not on file    Unstable Housing in the Last Year: Not on file       TOBACCO:   reports that she has been smoking cigarettes. She started smoking about 49 years ago. She has a 43.00 pack-year smoking history. She has never used smokeless tobacco.  ETOH:   reports no history of alcohol use. REVIEW OF SYSTEMS:    Vital: BP (!) 127/107   Pulse 84   Temp 98.5 °F (36.9 °C) (Oral)   Resp 26   Wt 213 lb 6.5 oz (96.8 kg)   SpO2 (!) 87%   BMI 36.63 kg/m²   Constitutional: no fever, no night sweats, no fatigue  Head: no headache, no head injury, no migranes. Eye: no blurring of vision, no double vision.   Ears: no hearing difficulty, no tinnitus  Mouth/throat: no ulceration, dental caries, dysphagia  Lungs: no cough, no shortness of breath, no wheeze  CVS: no palpitation, no chest pain, no shortness of breath  GI: no abdominal pain, no nausea , no vomiting, no constipation  PITER: no dysuria, frequency and urgency, no hematuria, no kidney stones  Musculoskeletal:back pain  Endocrine: no polyuria, polydypsia, no cold or heat intolerence  Hematology: no anemia, no easy brusing or bleeding, no hx of clotting disorder  Dermatology: no skin rash, no eczema, no prurities,  Psychiatry: no depression, no anxiety,no panic attacks, no suicide ideation  Neurology: no syncope, no seizures, no numbness or tingling of hands, no numbness or tingling of feet, no paresis      PHYSICAL EXAM:  Pt was in cath lab      CXR:   XR CHEST (2 VW)   Final Result   No acute cardiopulmonary disease. EKG:  I have reviewed the EKG. CBC   Recent Labs     06/16/22  1749   WBC 10.0   HGB 13.1   HCT 39.5         RENAL  Recent Labs     06/16/22  1749      K 4.4   CL 98*   CO2 23   BUN 22*   CREATININE 1.0     LFT'S  Recent Labs     06/16/22  1749   AST 27   ALT 29   BILITOT <0.2   ALKPHOS 48     COAG  No results for input(s): INR in the last 72 hours. CARDIAC ENZYMES  Recent Labs     06/16/22  1749 06/16/22  2210 06/17/22  0355   TROPONINI 0.04*  <0.01 0.09* 0.26*       U/A:    Lab Results   Component Value Date    COLORU YELLOW 08/16/2018    WBCUA 4 08/16/2018    RBCUA 5 08/16/2018    BACTERIA 1+ 08/16/2018    CLARITYU Clear 08/16/2018    SPECGRAV 1.014 08/16/2018    LEUKOCYTESUR Negative 08/16/2018    BLOODU Negative 08/16/2018    GLUCOSEU Negative 08/16/2018       ABG    Lab Results   Component Value Date    IKF6TBA 23.8 02/12/2015    BEART -1.1 02/12/2015    U4PGFBHI 81.8 02/12/2015    PHART 7.364 02/12/2015    THGBART 15.2 05/02/2011    VHU4SRM 42.9 02/12/2015    PO2ART 45.1 02/12/2015    LZT7SVC 25.1 02/12/2015           Active Hospital Problems    Diagnosis Date Noted    Chest pain [R07.9] 04/06/2014           ASSESSMENT/PLAN:  Chest pain- for angio  Uncontrolled dm-monitor sugars  Stop smoking    Pt is stable. Discussed with staff and pt about the plan.   See the orders for further plan. Will proceed further acc to pt's progress.     Electronically signed by Gloria Mendoza MD on 6/17/2022 at 7:13 AM

## 2022-06-17 NOTE — OP NOTE
[address]   Procedure Note    CLINICAL HISTORY:       Iva Zamudio is a 62 y.o. female with a history of coronary artery disease. She was admitted with complaints of chest pain. Cardiac markers were positive. EKG showed nonspecific ST-T wave abnormality. Patient Active Problem List   Diagnosis    Influenza A with respiratory manifestations    Type II or unspecified type diabetes mellitus without mention of complication, uncontrolled    Mixed hyperlipidemia    Depression    Hypertension    Pain in joint, multiple sites    Osteoarthritis    Other specified disorder of the esophagus    Sleep apnea    Chest pain    CAD (coronary artery disease)    Chronic hypoxemic respiratory failure (HCC)    Respiratory insufficiency    Chronic respiratory failure with hypoxia (HCC)    Smoking addiction    TR on CPAP    TR treated with BiPAP    Moderate COPD (chronic obstructive pulmonary disease) (HCC)    Rhinosinusitis    Diarrhea    Type 2 diabetes mellitus with hyperglycemia ([facility])    Anxiety    Tachycardia    Bilateral leg pain    Heart murmur    Diastolic dysfunction    Claudication (HCC)    End stage renal disease ([facility])    Morbid obesity ([facility])    Opioid use, unspecified with unspecified opioid-induced disorder    Opioid dependence with unspecified opioid-induced disorder    Opioid dependence, uncomplicated       Prior to Admission medications    Medication Sig Start Date End Date Taking?  Authorizing Provider   buPROPion (WELLBUTRIN XL) 150 MG extended release tablet Take 150 mg by mouth every evening   Yes Historical Provider, MD   insulin aspart (NOVOLOG FLEXPEN) 100 UNIT/ML injection pen Inject 8 Units into the skin 3 times daily (before meals) Along with sliding scale   Yes Historical Provider, MD   insulin glargine (LANTUS) 100 UNIT/ML injection vial Inject 60 Units into the skin nightly   Yes Historical Provider, MD   sertraline (ZOLOFT) 100 MG tablet Take 200 mg by mouth every morning   Yes Historical Provider, MD   budesonide-formoterol (SYMBICORT) 160-4.5 MCG/ACT AERO INHALE 2 PUFFS BY MOUTH TWICE DAILY 3/10/22   Aneudy Gore MD   fenofibrate (TRICOR) 145 MG tablet TAKE 1 TABLET BY MOUTH DAILY 6/13/22   Aneudy Gore MD   omeprazole (PRILOSEC) 40 MG delayed release capsule TAKE 1 CAPSULE BY MOUTH DAILY 6/13/22   Aneudy Gore MD   diazePAM (VALIUM) 5 MG tablet TAKE 1 TABLET BY MOUTH EVERY 8 HOURS AS NEEDED FOR ANXIETY 6/2/22 7/2/22  Aneudy Gore MD   HYDROcodone-acetaminophen (NORCO) 5-325 MG per tablet Take 1 tablet by mouth 2 times daily as needed for Pain for up to 30 days. 5/23/22 6/22/22  Aneudy Gore MD   lisinopril (PRINIVIL;ZESTRIL) 20 MG tablet TAKE 1 TABLET BY MOUTH DAILY.  5/16/22   Aneudy Gore MD   metFORMIN (GLUCOPHAGE) 1000 MG tablet TAKE 1 TABLET BY MOUTH TWICE DAILY 4/8/22   Aneudy Gore MD   cetirizine (ZYRTEC) 10 MG tablet Take 1 tablet by mouth daily 4/7/22   Aneudy Gore MD   tiotropium (SPIRIVA HANDIHALER) 18 MCG inhalation capsule INHALE THE CONTENTS OF 1 CAPSULE VIA INHALATION DEVICE EVERY DAY 4/6/22   Aneudy Gore MD   B-D ULTRAFINE III SHORT PEN 31G X 8 MM MISC USE 1 NEEDLE DAILY 2/10/22   Aneudy Gore MD   albuterol (PROVENTIL) (2.5 MG/3ML) 0.083% nebulizer solution Take 3 mLs by nebulization every 4 hours as needed for Wheezing 1/17/22   Aneudy Gore MD   levothyroxine (SYNTHROID) 25 MCG tablet 1 tab po daily 1/11/22   Aneudy Gore MD   metoprolol succinate (TOPROL XL) 100 MG extended release tablet Take 1 tablet by mouth daily 1/11/22   Aneudy Gore MD   atorvastatin (LIPITOR) 40 MG tablet Take 1 tablet by mouth daily 9/17/21   Ryan Soares MD   Insulin Syringe-Needle U-100 (INSULIN SYRINGE .5CC/30GX5/16\") 30G X 5/16\" 0.5 ML MISC USE AS DIRECTED THREE TIMES DAILY 8/21/19   Aneudy Gore MD   albuterol sulfate HFA (PROAIR HFA) 108 (90 Base) MCG/ACT inhaler Inhale 2 puffs into the lungs every 4 hours as needed for Wheezing or Shortness of Breath 2/5/19   José Luis Tariq, DO   aspirin 81 MG tablet Take 81 mg by mouth daily    Historical Provider, MD   ibuprofen (ADVIL;MOTRIN) 200 MG tablet Take 200 mg by mouth every 6 hours as needed for Pain    Historical Provider, MD   OXYGEN Inhale into the lungs 2L continious    Historical Provider, MD          The risks, benefits, and details of the procedure were explained to the patient. The patient verbalized understanding and wanted to proceed. Informed written consent was obtained. INDICATION:  NSTEMI    PROCEDURES PERFORMED:   Coronary angiogram  POBA    PROCEDURE TECHNIQUE:  The patient was approached from the right radial artery using a 5-6  French slender sheath. Left coronary angiography was done using a Arelis L3.5 diagnostic catheter. Right coronary angiography was done using a Arelis R4 guide catheter. CONTRAST:  Total of 85 cc. COMPLICATIONS:  None. At the end of the procedure a TR device was used for hemostasis. EBL: 5 cc    Moderate Sedation:  Start time: 1152  Stop time: 1232  2 mg versed   100 mcg fentanyl   An independent trained observer pushed medications at my direction. We monitored the patient's level of consciousness and vital signs/physiologic status throughout the procedure duration (see start and start times above). ANGIOGRAM/CORONARY ARTERIOGRAM:       The left main coronary artery is normal .    The left anterior descending artery is normal .   D1 99% instent stenosis     The left circumflex artery is normal .    The right coronary artery has mild disease, mid stent is widely patent . INTERVENTION  1. Guide catheter: 5F XB 3.5  2. Runthrough wire used to cross Diag   3. POBA w/ 2.5 NC balloon to 18 ACE     SUMMARY:   Single vessel CAD  S/p successful angioplasty of D1 stent     RECOMMENDATION:     1.  Recommend beta blockade, high potency statin, aspirin and brilinta for 12 months  2. Referral to cardiac rehab placed  3. Patient has been advised on the importance of regular exercise of at least 20-30 minutes daily. 4. Cleared for d/c home   5.  Outpatient follow up in 4-6 weeks with Dr. Christopher Jimenez MD [facility], Interventional Cardiology, and Peripheral Vascular [address] [facility]   (C): [phone]  [facility]): [phone]

## 2022-06-17 NOTE — PROGRESS NOTES
Orders received to discharge pt home. Education and scripts given to pt. Pt verbalized understanding and all questions were answered. PIVs were removed. Pt transported down to lobby by wheelchair for discharge home with family.   Electronically signed by Diaz Jean RN on 6/17/2022 at 5:21 PM

## 2022-06-17 NOTE — PROGRESS NOTES
Report given to Manpreet. Transported pt X2 RNs from [phone] to 2104 and pt tolerated well. No distress noted upon transfer.

## 2022-06-17 NOTE — PLAN OF CARE
Problem: Pain  Goal: Verbalizes/displays adequate comfort level or baseline comfort level  6/17/2022 0911 by Audra Liao RN  Outcome: Progressing  6/16/2022 2209 by Harsha Blanchard RN  Outcome: Progressing     Problem: Safety - Adult  Goal: Free from fall injury  6/17/2022 0911 by Audra Liao RN  Outcome: Progressing  6/16/2022 2209 by Harsha Blanchard RN  Outcome: Progressing  Pt calls out before getting up. No slip socks in place. Pt is assisted in getting out bed and ambulates to bathroom without any incident. Will continue to monitor.

## 2022-06-17 NOTE — PRE SEDATION
Sedation Pre-Procedure Note    Patient Name: Franck Contreras   YOB: 1960  Room/Bed: S6K-1407/2104-01  Medical Record Number: [id number]  Date: 6/17/2022   Time: 12:31 PM       Indication:  nstemi    Consent: I have discussed with the patient and/or the patient representative the indication, alternatives, and the possible risks and/or complications of the planned procedure and the anesthesia methods. The patient and/or patient representative appear to understand and agree to proceed. Vital Signs:   Vitals:    06/17/22 1100   BP: 125/65   Pulse: 76   Resp: 26   Temp:    SpO2: 92%       Past Medical History:   has a past medical history of Anxiety, Arthritis, CAD (coronary artery disease), Cancer ([facility] [address] COPD (chronic obstructive pulmonary disease) ([facility] [address] Depression, Diabetes mellitus ([facility] [address] GERD (gastroesophageal reflux disease), Hyperlipidemia, Hypertension, Hypothyroidism, Influenza A, Movement disorder, Oxygen deficit, Pneumonia, Psychiatric problem, Thyroid trouble, Tobacco abuse, and Type II or unspecified type diabetes mellitus without mention of complication, not stated as uncontrolled. Past Surgical History:   has a past surgical history that includes Coronary angioplasty with stent; Cholecystectomy; Abdomen surgery; LEEP; Cardiac surgery; Cervix biopsy; and Colonoscopy (09/07/2018).     Medications:   Scheduled Meds:    heparin (porcine)  4,000 Units IntraVENous Once    sodium chloride flush  5-40 mL IntraVENous 2 times per day    ticagrelor  90 mg Oral BID    aspirin  81 mg Oral Daily    atorvastatin  40 mg Oral Daily    mometasone-formoterol  2 puff Inhalation BID    buPROPion  150 mg Oral QPM    fenofibrate  160 mg Oral Daily    insulin glargine  60 Units SubCUTAneous Nightly    levothyroxine  25 mcg Oral Daily    lisinopril  20 mg Oral Daily    metFORMIN  1,000 mg Oral BID WC    metoprolol succinate  100 mg Oral Daily    sertraline  200 mg Oral QAM    ipratropium-albuterol  1 ampule Inhalation Q4H WA    pantoprazole  40 mg Oral QAM AC    insulin lispro  0-6 Units SubCUTAneous TID WC    insulin lispro  0-3 Units SubCUTAneous Nightly     Continuous Infusions:    sodium chloride      dextrose       PRN Meds: sodium chloride flush, sodium chloride, acetaminophen, nitroGLYCERIN, diazePAM, HYDROcodone-acetaminophen, glucose, dextrose bolus **OR** dextrose bolus, glucagon (rDNA), dextrose  Home Meds:   Prior to Admission medications    Medication Sig Start Date End Date Taking? Authorizing Provider   buPROPion (WELLBUTRIN XL) 150 MG extended release tablet Take 150 mg by mouth every evening   Yes Historical Provider, MD   insulin aspart (NOVOLOG FLEXPEN) 100 UNIT/ML injection pen Inject 8 Units into the skin 3 times daily (before meals) Along with sliding scale   Yes Historical Provider, MD   insulin glargine (LANTUS) 100 UNIT/ML injection vial Inject 60 Units into the skin nightly   Yes Historical Provider, MD   sertraline (ZOLOFT) 100 MG tablet Take 200 mg by mouth every morning   Yes Historical Provider, MD   budesonide-formoterol (SYMBICORT) 160-4.5 MCG/ACT AERO INHALE 2 PUFFS BY MOUTH TWICE DAILY 3/10/22   Vera Mason MD   fenofibrate (TRICOR) 145 MG tablet TAKE 1 TABLET BY MOUTH DAILY 6/13/22   Vera Mason MD   omeprazole (PRILOSEC) 40 MG delayed release capsule TAKE 1 CAPSULE BY MOUTH DAILY 6/13/22   Vera Mason MD   diazePAM (VALIUM) 5 MG tablet TAKE 1 TABLET BY MOUTH EVERY 8 HOURS AS NEEDED FOR ANXIETY 6/2/22 7/2/22  Vera Mason MD   HYDROcodone-acetaminophen (NORCO) 5-325 MG per tablet Take 1 tablet by mouth 2 times daily as needed for Pain for up to 30 days. 5/23/22 6/22/22  Vera Mason MD   lisinopril (PRINIVIL;ZESTRIL) 20 MG tablet TAKE 1 TABLET BY MOUTH DAILY.  5/16/22   Vera Mason MD   metFORMIN (GLUCOPHAGE) 1000 MG tablet TAKE 1 TABLET BY MOUTH TWICE DAILY 4/8/22   Vera Mason MD   cetirizine (ZYRTEC) 10 MG tablet Take 1 tablet by mouth daily 4/7/22   Timothy Rivera MD   tiotropium (SPIRIVA HANDIHALER) 18 MCG inhalation capsule INHALE THE CONTENTS OF 1 CAPSULE VIA INHALATION DEVICE EVERY DAY 4/6/22   Timothy Rivera MD   B-D ULTRAFINE III SHORT PEN 31G X 8 MM MISC USE 1 NEEDLE DAILY 2/10/22   Timothy Rivera MD   albuterol (PROVENTIL) (2.5 MG/3ML) 0.083% nebulizer solution Take 3 mLs by nebulization every 4 hours as needed for Wheezing 1/17/22   Timothy Rivera MD   levothyroxine (SYNTHROID) 25 MCG tablet 1 tab po daily 1/11/22   Timothy Rivera MD   metoprolol succinate (TOPROL XL) 100 MG extended release tablet Take 1 tablet by mouth daily 1/11/22   Timothy Rivera MD   atorvastatin (LIPITOR) 40 MG tablet Take 1 tablet by mouth daily 9/17/21   Aruna Posada MD   Insulin Syringe-Needle U-100 (INSULIN SYRINGE .5CC/30GX5/16\") 30G X 5/16\" 0.5 ML MISC USE AS DIRECTED THREE TIMES DAILY 8/21/19   Timothy Rivera MD   albuterol sulfate HFA (PROAIR HFA) 108 (90 Base) MCG/ACT inhaler Inhale 2 puffs into the lungs every 4 hours as needed for Wheezing or Shortness of Breath 2/5/19   Irina Milladr DO   aspirin 81 MG tablet Take 81 mg by mouth daily    Historical Provider, MD   ibuprofen (ADVIL;MOTRIN) 200 MG tablet Take 200 mg by mouth every 6 hours as needed for Pain    Historical Provider, MD   OXYGEN Inhale into the lungs 2L continious    Historical Provider, MD     Coumadin Use Last 7 Days:  no  Antiplatelet drug therapy use last 7 days: yes -  Other anticoagulant use last 7 days: no  Additional Medication Information:  n/a      Pre-Sedation Documentation and Exam:   I have personally completed a history, physical exam & review of systems for this patient (see notes).     Mallampati Airway Assessment:  Mallampati Class I - (soft palate, fauces, uvula & anterior/posterior tonsillar pillars are visible)    Prior History of Anesthesia Complications:   none    ASA Classification:  Class 3 - A patient with severe systemic disease that limits activity but is not incapacitating    Sedation/ Anesthesia Plan:   intravenous sedation    Medications Planned:   midazolam (Versed) intravenously    Patient is an appropriate candidate for plan of sedation: yes    Electronically signed by Omayra Lott MD on 6/17/2022 at 12:31 PM

## 2022-06-17 NOTE — PROGRESS NOTES
Patient admitted to room 4116 from ER. Oriented to room, call light, and floor policies. Plan of care reviewed with patient/family. Pt is resting in bed and denies pain at this time; no s/s of distress noted. Assessment completed; VSS. Tele in place reading Sinus rhythm with a rate of 95. Safety precautions in place; call light and bedside table within reach. Pt encouraged to call for needs or ambulation. Pt VU. Will continue to monitor.

## 2022-06-17 NOTE — ACP (ADVANCE CARE PLANNING)
Advance Care Planning     Advance Care Planning Inpatient Note  [facility] Department    Today's Date: 6/17/2022  Unit: [facility]    Received request from IDT Member. Upon review of chart and communication with care team, patient's decision making abilities are not in question. . Patient was/were present in the room during visit. Goals of ACP Conversation:  Discuss advance care planning documents    Health Care Decision Makers:       Primary Decision Maker: Lisette Berger - [phone]    Secondary Decision Maker: Talha Cano [facility] [phone]    Summary:  Documented Next of Kin, per patient report    Advance Care Planning Documents (Patient Wishes):  None     Assessment:  Patient awake and alert, lying in bed, waiting to be discharged today. Patient is  and has two adult children, she acknowledged that they are her next of kin healthcare decision makers.     Interventions:  Provided education on documents for clarity and greater understanding  Discussed and provided education on state decision maker hierarchy  gave patient AD forms and information to take home    Care Preferences Communicated:   No    Outcomes/Plan:  ACP Discussion: Completed    Electronically signed by Sriram Le [facility] on 6/17/2022 at 2:54 PM

## 2022-06-17 NOTE — PROGRESS NOTES
Clinical Pharmacy Note  Heparin Dosing       Lab Results   Component Value Date    APTT 28.2 06/17/2022     Lab Results   Component Value Date    HGB 13.1 06/16/2022    HCT 39.5 06/16/2022     06/16/2022    INR 0.92 12/02/2014       Current Infusion Rate: 1000 units/hr    Plan:  Bolus: 4000 units  Rate: 1390 units/hr  Next aPTT: 1800    Pharmacy will continue to monitor and adjust based on aPTT results.

## 2022-06-17 NOTE — PROGRESS NOTES
Notified Dr. Wu Silva of pt troponin  0.09. Dr. Wu Silva wanted pt to transfer to PCU and wanted RN to notify Cardiologist of pt status. Dr. Krysten Pena notified that pt currently having chest pain and has elevated Troponin. New orders received for heparin drip and Nitro drip with titration. Notified Dr. Wu Silva that pt has to be transferred to ICU d/t Nitro drip with titration. Heparin started with No ASE. Notified pt and pt daughter that pt will be transferred to ICU.

## 2022-06-17 NOTE — PROGRESS NOTES
Medication Reconciliation     List of medications patient is currently taking is complete. Source of information:   1. Conversation with patient at bedside  2. EPIC records        Notes regarding home medications:  1. Patient received some of her AM home medications today PTA. Denies any other OTC/herbal medication.     Guillermo Cortes, Pharmacy Intern

## 2022-06-17 NOTE — PLAN OF CARE
Problem: Discharge Planning  Goal: Discharge to home or other facility with appropriate resources  Outcome: Progressing  Flowsheets (Taken 6/16/2022 2158)  Discharge to home or other facility with appropriate resources: Identify barriers to discharge with patient and caregiver     Problem: Pain  Goal: Verbalizes/displays adequate comfort level or baseline comfort level  Outcome: Progressing     Problem: Safety - Adult  Goal: Free from fall injury  Outcome: Progressing

## 2022-06-17 NOTE — CONSULTS
Clinical Pharmacy Note  Heparin Dosing       Lab Results   Component Value Date    APTT 27.1 06/16/2022     Lab Results   Component Value Date    HGB 13.1 06/16/2022    HCT 39.5 06/16/2022     06/16/2022    INR 0.92 12/02/2014       Plan:  Initial bolus: 4000 units  Initial infusion rate: 1000 units/hr  Next aPTT: 0700 6/17/22    Pharmacy will continue to monitor and adjust based on aPTT results.   José Luis Landaverde, PharmD

## 2022-06-20 ENCOUNTER — CARE COORDINATION (OUTPATIENT)
Dept: CASE MANAGEMENT | Age: 62
End: 2022-06-20

## 2022-06-20 NOTE — CARE COORDINATION
[facility] Transitions Initial Follow Up Call    Call within 2 business days of discharge: Yes    Patient: Vika Hong Patient : 1960   MRN: [id number]  Reason for Admission: Chest Pain  Discharge Date: 22 RARS: Readmission Risk Score: 13 ( )      Last Discharge [facility]       Complaint Diagnosis Description Type Department Provider    22 Chest Pain Chest pain, unspecified type ED to Hosp-Admission (Discharged) (ADMITTED) [facility] ICU De Nobles MD; Agnes Chaves ... Spoke with: no one    Facility: [address] [place] [address] Transitions 24 Hour Call    Care Transitions Interventions       Initial attempt at CT discharge phone call. Unable to reach patient. Left message. Contact info provided. Requested return call to CTN.     Follow Up  Future Appointments   Date Time Provider Becka Choe   2022  9:30 AM Fabiola Segura MD [facility]   2022  9:30 AM De Nobles MD [facility]   2022 11:30 AM SCHEDULE, [facility] ECHO ROOM 2 [facility]   2022  1:00 PM Fabiola Segura MD [facility]       Amador Florence RN BSN  Care Transition Nurse  [phone]

## 2022-06-21 ENCOUNTER — CARE COORDINATION (OUTPATIENT)
Dept: CASE MANAGEMENT | Age: 62
End: 2022-06-21

## 2022-06-21 DIAGNOSIS — R07.1 CHEST PAIN ON BREATHING: Primary | ICD-10-CM

## 2022-06-21 PROCEDURE — 1111F DSCHRG MED/CURRENT MED MERGE: CPT

## 2022-06-21 NOTE — CARE COORDINATION
[facility] Transitions Initial Follow Up Call    Call within 2 business days of discharge: Yes    Patient: Franck Contreras Patient : 1960   MRN: [id number]  Reason for Admission: Chest Pain  Discharge Date: 22 RARS: Readmission Risk Score: 13 ( )      Last Discharge [facility]       Complaint Diagnosis Description Type Department Provider    22 Chest Pain Chest pain, unspecified type ED to Hosp-Admission (Discharged) (ADMITTED) [facility] ICU Lizzette Trujillo MD; Nehemias Gregorio ... Transitions of Care Initial Call    Was this an external facility discharge? No Discharge Facility: N/A    Challenges to be reviewed by the provider   Additional needs identified to be addressed with provider: No               Method of communication with provider : none        Care Transition Nurse contacted the patient by telephone to perform post hospital discharge assessment. Verified name and  with patient as identifiers. Provided introduction to self, and explanation of the CTN role. CTN reviewed discharge instructions, medical action plan and red flags with patient who verbalized understanding. Patient given an opportunity to ask questions and does not have any further questions or concerns at this time. Were discharge instructions available to patient? Yes. Reviewed appropriate site of care based on symptoms and resources available to patient including: PCP  When to call 911. The patient agrees to contact the PCP office for questions related to their healthcare. Medication reconciliation was performed with patient, who verbalizes understanding of administration of home medications. Advised obtaining a 90-day supply of all daily and as-needed medications. Was patient discharged with a pulse oximeter? no    CTN provided contact information.    Plan for next call: PCP HFU appt - chest pain        Non-face-to-face services provided:  Obtained and reviewed discharge summary and/or continuity of care documents  Assessment and support for treatment adherence and medication management-medication list reviewed & 1111f completed. Care Transitions 24 Hour Call    Do you have a copy of your discharge instructions?: Yes  Do you have all of your prescriptions and are they filled?: Yes  Have you been contacted by a [address]?: No  Have you scheduled your follow up appointment?: No  Do you have support at home?: Partner/Spouse/SO  Do you feel like you have everything you need to keep you well at home?: Yes  Are you an active caregiver in your home?: No  Care Transitions Interventions       2nd attempt at CT discharge phone call. Michelle French states she is doing \"okay\". She states she has home oxygen therapy - uses this continuously @ 2lpm. She states she has a pulse oximeter at home. She states she checks her O2 sats only when she is having an issue - and has not done so today. Michelle French states she has a Bipap machine at home and uses it on a regular basis. She states Sandie Worthy will be out of town at the end of the week. Michelle French states she will call next week and schedule a hospital follow up. She states her  will probably provide her transportation. She confirmed her appt with Erin Gardner on 07/28/2022. Michelle French denies any chest pain at this time. She states her chronic back pain is stable. Michelle French states her BG level this morning = 156. She states the puncture site from the angiogram is intact & bruised. Michelle French states she is tolerating food & fluids - denies any N/V. She states she is urinating without difficulty and her bowels are moving. Michelle French states she has a hand held nebulizer at home. She states she uses this prn. She states she has not needed this since she returned home. She denies any needs at this time.     Follow Up  Future Appointments   Date Time Provider Becka Choe   7/28/2022  9:30 AM Trey Escamilla MD [address]   9/13/2022  9:30 AM Germaine Mendieta MD [facility]   9/27/2022 11:30 AM SCHEDULE, [facility] ECHO ROOM 2 [facility]   9/30/2022  1:00 PM Evita Blount MD [facility]       Magui Orona RN BSN  Care Transition Nurse  [phone]

## 2022-06-22 NOTE — PROGRESS NOTES
Physician Progress Note      PATIENT:               Sanna Holder  CSN #:                  [id number]  :                       1960  ADMIT DATE:       2022 5:23 PM  [address] DATE:        2022 5:26 PM  RESPONDING  PROVIDER #:        Omar Acevedo MD          QUERY TEXT:    Pt admitted with NSTEMI . Pt noted to have  99% in stent stenosis of D1. If   possible, please document in progress notes and discharge summary the   relationship, if any, between NSTEMI and  instent stenosis. The medical record reflects the following:  Risk Factors: NSTEMI  - previous stent - hx of CAD  Clinical Indicators: chest pain, per op note, \" D1 99% instent stenosis\" and   \"S/p successful angioplasty of D1 stent\"  Treatment: cardiology consult, angioplasty, EKG, nitro SL, ASA  Options provided:  -- NSTEMI due to instent stenosis  -- NSTEMI unrelated to instent stenosis  -- Other - I will add my own diagnosis  -- Disagree - Not applicable / Not valid  -- Disagree - Clinically unable to determine / Unknown  -- Refer to Clinical Documentation Reviewer    PROVIDER RESPONSE TEXT:    This patient has NSTEMI due to instent stenosis .     Query created by: Christiano Guerrero on 2022 11:12 AM      Electronically signed by:  Omar Acevedo MD 2022 10:03 PM

## 2022-06-29 ENCOUNTER — CARE COORDINATION (OUTPATIENT)
Dept: CASE MANAGEMENT | Age: 62
End: 2022-06-29

## 2022-06-29 NOTE — CARE COORDINATION
[facility] Transitions Follow Up Call    2022    Patient: Marry Weinstein  Patient : 1960   MRN: [id number]  Reason for Admission: Chest Pain  Discharge Date: 22 RARS: Readmission Risk Score: 13 ( )         Spoke with: no one    Care Transitions Subsequent and Final Call    Subsequent and Final Calls  Care Transitions Interventions  Other Interventions:         Unable to reach patient for CT follow up phone call. Left message. Contact info provided. Requested return call to CTN.     Follow Up  Future Appointments   Date Time Provider Becka Choe   2022  9:30 AM MD YONY Landeros   2022  9:30 AM Freddi Skiff, MD [facility]   2022 11:30 AM SCHEDULE, [facility] ECHO ROOM 2 [facility]   2022  1:00 PM Jo-Ann Bocanegra MD [facility] Room RN BSN  Care Transition Nurse  [phone]

## 2022-07-03 NOTE — DISCHARGE SUMMARY
Hospitalist Discharge Summary   7/3/2022 6:21 PM  Subjective:   Admit Date: 6/16/2022  PCP: Audrey Riddle MD  Interval History: pt is ready for the discharge  Feels better  Pt is admitted with chest pain  Mi r/out  Had angio  Rest of the events as in progress notes and chart  Denies any other complaints  Chart reviewed. Diet: No diet orders on file  Medications:   Scheduled Meds:  Continuous Infusions:  CBC: No results for input(s): WBC, HGB, PLT in the last 72 hours. BMP:  No results for input(s): NA, K, CL, CO2, BUN, CREATININE, GLUCOSE in the last 72 hours. Hepatic: No results for input(s): AST, ALT, ALB, BILITOT, ALKPHOS in the last 72 hours. Troponin: No results for input(s): TROPONINI in the last 72 hours. BNP: No results for input(s): BNP in the last 72 hours. Lipids: No results for input(s): CHOL, HDL in the last 72 hours. Invalid input(s): LDLCALCU  INR: No results for input(s): INR in the last 72 hours.   Orders Placed This Encounter   Procedures    XR CHEST (2 VW)     Standing Status:   Standing     Number of Occurrences:   1     Order Specific Question:   Reason for exam:     Answer:   chest pain    CBC with Auto Differential     Standing Status:   Standing     Number of Occurrences:   1    Comprehensive Metabolic Panel     Standing Status:   Standing     Number of Occurrences:   1    Lipase     Standing Status:   Standing     Number of Occurrences:   1    Brain Natriuretic Peptide     Standing Status:   Standing     Number of Occurrences:   1    Troponin     Standing Status:   Standing     Number of Occurrences:   1    Troponin     Standing Status:   Standing     Number of Occurrences:   1    APTT     Standing Status:   Standing     Number of Occurrences:   1    APTT     Standing Status:   Standing     Number of Occurrences:   1    Basic Metabolic Panel     Standing Status:   Standing     Number of Occurrences:   1    Inpatient consult to Primary Care Provider     Standing Status:   Standing     Number of Occurrences:   1     Order Specific Question:   Reason for Consult? Answer:   Chest pain. Admission orders please    Inpatient consult to Cardiology     Standing Status:   Standing     Number of Occurrences:   1     Order Specific Question:   Reason for Consult? Answer:   chest pain    Inpatient consult to Respiratory Care     Standing Status:   Standing     Number of Occurrences:   1     Order Specific Question:   Reason for Consult? Answer:   personal bi-pap at bedside    Inpatient consult to Phase 2 Cardiac Rehab     Order needs printed and faxed to the Outpatient Cardiac Rehab of the patient's choice     Standing Status:   Standing     Number of Occurrences:   1     Order Specific Question:   Reason for Consult? Answer:   nstemi    Inpatient consult to Phase 1 Cardiac Rehab     Standing Status:   Standing     Number of Occurrences:   1     Order Specific Question:   Reason for Consult?      Answer:   nstemi    POCT Glucose     Standing Status:   Standing     Number of Occurrences:   1    POCT Glucose     Standing Status:   Standing     Number of Occurrences:   1    POC ACT-Low Range     Standing Status:   Standing     Number of Occurrences:   1    POCT Glucose     Standing Status:   Standing     Number of Occurrences:   1    POCT Glucose     Standing Status:   Standing     Number of Occurrences:   1    EKG 12 Lead     Standing Status:   Standing     Number of Occurrences:   1     Order Specific Question:   Reason for Exam?     Answer:   Chest pain    EKG 12 Lead     Standing Status:   Standing     Number of Occurrences:   1     Order Specific Question:   Reason for Exam?     Answer:   Chest pain    EKG Rhythm Strip     Standing Status:   Standing     Number of Occurrences:   1    EKG Rhythm Strip     Standing Status:   Standing     Number of Occurrences:   1    EKG Rhythm Strip     Standing Status:   Standing     Number of Occurrences:   1    EKG 12 lead     Standing Status:   Standing     Number of Occurrences:   1     Order Specific Question:   Reason for Exam?     Answer: Other     Comments:   Post PCI    EKG Rhythm Strip     Standing Status:   Standing     Number of Occurrences:   1    Saline lock IV     Standing Status:   Standing     Number of Occurrences:   1    ADMIT TO INPATIENT     Standing Status:   Standing     Number of Occurrences:   1     Order Specific Question:   Discharge Plan:     Answer:   Home with Office Follow-up    Transfer patient     Standing Status:   Standing     Number of Occurrences:   1    Discharge patient     Standing Status:   Standing     Number of Occurrences:   1     Order Specific Question:   Discharge Disposition     Answer:   Home       No current facility-administered medications for this encounter. Current Outpatient Medications   Medication Sig Dispense Refill    ticagrelor (BRILINTA) 90 MG TABS tablet Take 1 tablet by mouth 2 times daily 60 tablet 2    buPROPion (WELLBUTRIN XL) 150 MG extended release tablet Take 150 mg by mouth every evening      insulin aspart (NOVOLOG FLEXPEN) 100 UNIT/ML injection pen Inject 8 Units into the skin 3 times daily (before meals) Along with sliding scale      insulin glargine (LANTUS) 100 UNIT/ML injection vial Inject 60 Units into the skin nightly      sertraline (ZOLOFT) 100 MG tablet Take 200 mg by mouth every morning      insulin aspart (NOVOLOG) 100 UNIT/ML injection vial ADMINISTER UP TO 70 UNITS UNDER THE SKIN EVERY DAY AS DIRECTED PER SLIDING SCALE 10 mL 2    LANTUS SOLOSTAR 100 UNIT/ML injection pen ADMINISTER 52 UNITS UNDER THE SKIN EVERY NIGHT 15 mL 2    HYDROcodone-acetaminophen (NORCO) 5-325 MG per tablet Take 1 tablet by mouth 2 times daily as needed for Pain for up to 30 days.  55 tablet 0    fenofibrate (TRICOR) 145 MG tablet TAKE 1 TABLET BY MOUTH DAILY 30 tablet 2    omeprazole (PRILOSEC) 40 MG delayed release capsule TAKE 1 CAPSULE BY MOUTH DAILY 30 capsule 2    lisinopril (PRINIVIL;ZESTRIL) 20 MG tablet TAKE 1 TABLET BY MOUTH DAILY.  30 tablet 5    metFORMIN (GLUCOPHAGE) 1000 MG tablet TAKE 1 TABLET BY MOUTH TWICE DAILY 60 tablet 3    cetirizine (ZYRTEC) 10 MG tablet Take 1 tablet by mouth daily 30 tablet 1    tiotropium (SPIRIVA HANDIHALER) 18 MCG inhalation capsule INHALE THE CONTENTS OF 1 CAPSULE VIA INHALATION DEVICE EVERY DAY 30 capsule 5    budesonide-formoterol (SYMBICORT) 160-4.5 MCG/ACT AERO INHALE 2 PUFFS BY MOUTH TWICE DAILY 10.2 g 5    B-D ULTRAFINE III SHORT PEN 31G X 8 MM MISC USE 1 NEEDLE DAILY 100 each 2    albuterol (PROVENTIL) (2.5 MG/3ML) 0.083% nebulizer solution Take 3 mLs by nebulization every 4 hours as needed for Wheezing 360 mL 11    levothyroxine (SYNTHROID) 25 MCG tablet 1 tab po daily 90 tablet 0    metoprolol succinate (TOPROL XL) 100 MG extended release tablet Take 1 tablet by mouth daily 90 tablet 1    atorvastatin (LIPITOR) 40 MG tablet Take 1 tablet by mouth daily 90 tablet 3    Insulin Syringe-Needle U-100 (INSULIN SYRINGE .5CC/30GX5/16\") 30G X 5/16\" 0.5 ML MISC USE AS DIRECTED THREE TIMES DAILY 100 each 0    albuterol sulfate HFA (PROAIR HFA) 108 (90 Base) MCG/ACT inhaler Inhale 2 puffs into the lungs every 4 hours as needed for Wheezing or Shortness of Breath 1 Inhaler 6    aspirin 81 MG tablet Take 81 mg by mouth daily      OXYGEN Inhale into the lungs 2L continious         Orders Placed This Encounter   Medications    DISCONTD: nitroGLYCERIN (NITROSTAT) SL tablet 0.4 mg    aspirin chewable tablet 324 mg    DISCONTD: aspirin chewable tablet 81 mg    DISCONTD: atorvastatin (LIPITOR) tablet 40 mg    DISCONTD: mometasone-formoterol (DULERA) 200-5 MCG/ACT inhaler 2 puff    DISCONTD: buPROPion (WELLBUTRIN XL) extended release tablet 150 mg    DISCONTD: diazePAM (VALIUM) tablet 5 mg    DISCONTD: fenofibrate (TRIGLIDE) tablet 160 mg    DISCONTD: HYDROcodone-acetaminophen (NORCO) 5-325 MG per tablet 1 tablet    DISCONTD: insulin glargine (LANTUS) injection vial 60 Units    DISCONTD: levothyroxine (SYNTHROID) tablet 25 mcg    DISCONTD: lisinopril (PRINIVIL;ZESTRIL) tablet 20 mg    DISCONTD: metFORMIN (GLUCOPHAGE) tablet 1,000 mg    DISCONTD: metoprolol succinate (TOPROL XL) extended release tablet 100 mg    DISCONTD: sertraline (ZOLOFT) tablet 200 mg    DISCONTD: ipratropium-albuterol (DUONEB) nebulizer solution 1 ampule     Order Specific Question:   Initiate RT Bronchodilator Protocol     Answer: Yes    DISCONTD: pantoprazole (PROTONIX) tablet 40 mg    DISCONTD: insulin lispro (HUMALOG) injection vial 0-6 Units    DISCONTD: insulin lispro (HUMALOG) injection vial 0-3 Units    DISCONTD: glucose chewable tablet 16 g    DISCONTD: dextrose bolus 10% 125 mL    DISCONTD: dextrose bolus 10% 250 mL    DISCONTD: glucagon (rDNA) injection 1 mg    DISCONTD: dextrose 5 % solution    DISCONTD: heparin 25,000 unit in sodium chloride 0.45% 250 mL (premix) infusion    DISCONTD: nitroGLYCERIN 50 mg in dextrose 5% 250 mL infusion     Order Specific Question:   Titrate Infusion? Answer:   Yes     Order Specific Question:   Initial Infusion Dose: Answer:   5 mcg/min     Order Specific Question:   Goal of Therapy is:      Answer:   Chest pain symptom relief     Order Specific Question:   Contact Provider if:     Answer:   SBP less than 90 mmHg    heparin (porcine) injection 4,000 Units    DISCONTD: eptifibatide (INTEGRILIN) 0.75 mg/mL infusion    DISCONTD: heparin (porcine) injection 4,000 Units    DISCONTD: sodium chloride flush 0.9 % injection 5-40 mL    DISCONTD: sodium chloride flush 0.9 % injection 5-40 mL    DISCONTD: 0.9 % sodium chloride infusion    DISCONTD: acetaminophen (TYLENOL) tablet 650 mg    DISCONTD: ticagrelor (BRILINTA) tablet 90 mg    iopamidol (ISOVUE-370) 76 % injection 85 mL    ticagrelor (BRILINTA) 90 MG TABS tablet     Sig: Take 1 tablet by mouth 2 times daily Dispense:  60 tablet     Refill:  2           Objective:   Vitals: BP (!) 140/78   Pulse 68   Temp 97.5 °F (36.4 °C) (Oral)   Resp 17   Wt 213 lb 6.5 oz (96.8 kg)   SpO2 97%   BMI 36.63 kg/m²   General appearance: alert,awake,oriented x 3 and cooperative with exam  HEENT: Head: Normal, normocephalic, atraumatic, anicteric, pupils are reactive to light. Dry mucous membrane.   Neck: no adenopathy, no carotid bruit, supple, symmetrical, trachea midline and thyroid not enlarged, symmetric, no tenderness/mass/nodules  Lungs: clear  Heart: regular rate and rhythm, S1, S2 normal, no murmur, click, rub or gallop  Abdomen: soft, non-tender; bowel sounds normal; no masses,  no organomegaly  Extremities: extremities normal, Neurologic: Mental status: Alert, oriented, thought content appropriate    Assessment and Plan:   Chest pain- mi r/out  Dm poorly controlled- monitor c/s  htn  Patient Active Problem List:     Influenza A with respiratory manifestations     Type II or unspecified type diabetes mellitus without mention of complication, uncontrolled     Mixed hyperlipidemia     Depression     Hypertension     Pain in joint, multiple sites     Osteoarthritis     Other specified disorder of the esophagus     Sleep apnea     Chest pain     CAD (coronary artery disease)     Chronic hypoxemic respiratory failure (HCC)     Respiratory insufficiency     Chronic respiratory failure with hypoxia (HCC)     Smoking addiction     TR on CPAP     TR treated with BiPAP     Moderate COPD (chronic obstructive pulmonary disease) (HCC)     Rhinosinusitis     Diarrhea     Type 2 diabetes mellitus with hyperglycemia ([facility])     Anxiety     Tachycardia     Bilateral leg pain     Heart murmur     Diastolic dysfunction     Claudication (HCC)     End stage renal disease (HCC)     Morbid obesity (HCC)     Opioid use, unspecified with unspecified opioid-induced disorder     Opioid dependence with unspecified opioid-induced disorder     Opioid dependence, uncomplicated    Pt is stable. Discussed with staff and pt about the plan. See the orders for further plan. Will proceed further acc to pt's progress. Time spent with management of the pt is- 20 mts  Follow up in office in 1 wk. See the [address] and [facility] rec forms  Follow up with specialists as instructed by them. Advised the pt to call me in case of questions or worsening of symptoms. Pt voiced understanding of the instructions.   Condition and prognosis is guarded      Electronically signed by: Roger Joaquin MD, on 7/3/2022, at 6:21 PM

## 2022-07-08 ENCOUNTER — CARE COORDINATION (OUTPATIENT)
Dept: CASE MANAGEMENT | Age: 62
End: 2022-07-08

## 2022-07-08 NOTE — CARE COORDINATION
[facility] Transitions Follow Up Call    2022    Patient: Ryan Moore  Patient : 1960   MRN: [id number]  Reason for Admission: CP  Discharge Date: 22 RARS: Readmission Risk Score: 13 ( )       Attempted to contact patient for follow up transition call. Left voicemail message to return call with an update on condition since discharge. Contact information provided. Will continue to follow up. Care Transitions Subsequent and Final Call    Subsequent and Final Calls  Care Transitions Interventions  Other Interventions:            Follow Up  Future Appointments   Date Time Provider Becka Choe   2022  9:30 AM MD YONY Russo   2022  9:30 AM Philip Herzog MD [facility]   2022 11:30 AM SCHEDULE, [facility] ECHO ROOM 2 [facility]   2022  1:00 PM Francisca Paul MD [facility] NUVIA Jimenez LPN

## 2022-07-14 ENCOUNTER — CARE COORDINATION (OUTPATIENT)
Dept: CASE MANAGEMENT | Age: 62
End: 2022-07-14

## 2022-07-14 NOTE — CARE COORDINATION
[facility] Transitions Follow Up Call    2022    Patient: Ivar Miss  Patient : 1960   MRN: [id number]  Reason for Admission: Chest Pain  Discharge Date: 22 RARS: Readmission Risk Score: 13 ( )         Spoke with: no one    Care Transitions Subsequent and Final Call    Subsequent and Final Calls  Care Transitions Interventions  Other Interventions:         3rd and final attempt at CT follow up phone call. Unable to reach patient. Left message. Informed Alice Vyas this would be the final CTN outreach.     Follow Up  Future Appointments   Date Time Provider Becka Choe   2022  9:30 AM MD YONY Cao   2022  9:30 AM Michelle Ugarte MD [facility]   2022 11:30 AM SCHEDULE, [facility] ECHO ROOM 2 [facility] [place]   2022  1:00 PM Herve Velarde MD [facility] NUVIA Reaves RN BSN  Care Transition Nurse  [phone]

## 2022-07-27 NOTE — PROGRESS NOTES
[address]               Cardiology Progress Note    Ryan Moore  1960    2022      CC: \"I am here for follow up after my stent. \"    HPI:  The patient is 62 y.o. female with a past medical history significant for diabetes, COPD, hypothyroid, GERD, anxiety and depression and presents for management of her CAD, hypertension, hyperlipidemia, has known coronary artery disease with history of stenting over 10 years ago. Today, she is here for follow up after angiogram. She has been doing well since the procedure. She is on continuous oxygen therapy. She continues to smoke on a daily basis but says that she is trying to cut back. Patient denies exertional chest pain/pressure,palpitations, lightheadedness, weight changes, changes in LE edema, and syncope. Patient reports compliance to her medications.      Past Medical History:   Diagnosis Date    Anxiety     Arthritis     CAD (coronary artery disease)     Cancer ([address]     cervical cancer - cone biopsy done    COPD (chronic obstructive pulmonary disease) (HCC)     Depression     Diabetes mellitus (HCC)     type II, controlled with pills, not currently on insulin    GERD (gastroesophageal reflux disease)     Hyperlipidemia     Hypertension     Hypothyroidism     Influenza A 14    Movement disorder     muscle spasms    Oxygen deficit     2L continu    Pneumonia     Psychiatric problem     anxiety and depression    Thyroid trouble     Tobacco abuse     Type II or unspecified type diabetes mellitus without mention of complication, not stated as uncontrolled      Past Surgical History:   Procedure Laterality Date    ABDOMEN SURGERY       x 2    CARDIAC SURGERY      stents  and     CERVIX BIOPSY      cone    CHOLECYSTECTOMY      COLONOSCOPY  2018    CORONARY ANGIOPLASTY WITH STENT PLACEMENT  2022    LEEP      abnormal cells (cancerous)     Family History   Problem Relation Age of Onset    Cancer Mother lung    Heart Disease Mother     Heart Disease Father         MI    Diabetes Father     High Blood Pressure Sister     Heart Disease Brother         multiple heart attacks    Other Brother         diverticulitis     Social History     Tobacco Use    Smoking status: Every Day     Packs/day: 1.00     Years: 43.00     Pack years: 43.00     Types: Cigarettes     Start date: 1/1/1973    Smokeless tobacco: Never    Tobacco comments:     cessation 2/15, she was cutting down   Vaping Use    Vaping Use: Never used   Substance Use Topics    Alcohol use: No    Drug use: No       Allergies   Allergen Reactions    Ciprofloxacin Swelling     Pt states throat swells    Sulfa Antibiotics Swelling     Pt states throat swells and she gets rash     Current Outpatient Medications   Medication Sig Dispense Refill    levothyroxine (SYNTHROID) 25 MCG tablet 1 tab po daily 90 tablet 0    HYDROcodone-acetaminophen (NORCO) 5-325 MG per tablet Take 1 tablet by mouth 2 times daily as needed for Pain for up to 30 days. 55 tablet 0    diazePAM (VALIUM) 5 MG tablet 1 tab po tid prn 90 tablet 0    ticagrelor (BRILINTA) 90 MG TABS tablet Take 1 tablet by mouth 2 times daily 60 tablet 2    buPROPion (WELLBUTRIN XL) 150 MG extended release tablet Take 150 mg by mouth every evening      insulin aspart (NOVOLOG FLEXPEN) 100 UNIT/ML injection pen Inject 8 Units into the skin 3 times daily (before meals) Along with sliding scale      insulin glargine (LANTUS) 100 UNIT/ML injection vial Inject 60 Units into the skin nightly      sertraline (ZOLOFT) 100 MG tablet Take 200 mg by mouth every morning      fenofibrate (TRICOR) 145 MG tablet TAKE 1 TABLET BY MOUTH DAILY 30 tablet 2    omeprazole (PRILOSEC) 40 MG delayed release capsule TAKE 1 CAPSULE BY MOUTH DAILY 30 capsule 2    lisinopril (PRINIVIL;ZESTRIL) 20 MG tablet TAKE 1 TABLET BY MOUTH DAILY.  30 tablet 5    metFORMIN (GLUCOPHAGE) 1000 MG tablet TAKE 1 TABLET BY MOUTH TWICE DAILY 60 tablet 3 cetirizine (ZYRTEC) 10 MG tablet Take 1 tablet by mouth daily 30 tablet 1    tiotropium (SPIRIVA HANDIHALER) 18 MCG inhalation capsule INHALE THE CONTENTS OF 1 CAPSULE VIA INHALATION DEVICE EVERY DAY 30 capsule 5    budesonide-formoterol (SYMBICORT) 160-4.5 MCG/ACT AERO INHALE 2 PUFFS BY MOUTH TWICE DAILY 10.2 g 5    B-D ULTRAFINE III SHORT PEN 31G X 8 MM MISC USE 1 NEEDLE DAILY 100 each 2    albuterol (PROVENTIL) (2.5 MG/3ML) 0.083% nebulizer solution Take 3 mLs by nebulization every 4 hours as needed for Wheezing 360 mL 11    metoprolol succinate (TOPROL XL) 100 MG extended release tablet Take 1 tablet by mouth daily 90 tablet 1    atorvastatin (LIPITOR) 40 MG tablet Take 1 tablet by mouth daily 90 tablet 3    Insulin Syringe-Needle U-100 (INSULIN SYRINGE .5CC/30GX5/16\") 30G X 5/16\" 0.5 ML MISC USE AS DIRECTED THREE TIMES DAILY 100 each 0    albuterol sulfate HFA (PROAIR HFA) 108 (90 Base) MCG/ACT inhaler Inhale 2 puffs into the lungs every 4 hours as needed for Wheezing or Shortness of Breath 1 Inhaler 6    aspirin 81 MG tablet Take 81 mg by mouth daily      OXYGEN Inhale into the lungs 2L continious       No current facility-administered medications for this visit.        Review of Systems: all reviewed and refer to HPI     Constitutional: negative  Eyes: negative  Ears, nose, mouth, throat, and face: negative  Respiratory: negative  Cardiovascular: negative  Gastrointestinal: negative  Genitourinary:negative  Integument/breast: negative  Hematologic/lymphatic: negative  Musculoskeletal:negative  Neurological: positive for negative  Behavioral/Psych: negative  Endocrine: negative  Allergic/Immunologic: negative   Vascular: BLE pain, cramping, aching with activity, endorses sedentary lifestyle      Physical Exam:   Vitals:    07/28/22 0935   BP: 120/76   Pulse: 75   SpO2: 96%   Weight: 211 lb (95.7 kg)   Height: 5' 4\" (1.626 m)       Wt Readings from Last 3 Encounters:   07/28/22 211 lb (95.7 kg)   06/17/22 213 lb 6.5 oz (96.8 kg)   06/13/22 213 lb (96.6 kg)       Constitutional: She is oriented to person, place, and time. She appears well-developed and well-nourished. In no acute distress. Head: Normocephalic and atraumatic. Pupils equal and round. Neck: Neck supple. No JVP or carotid bruit appreciated. No mass and no thyromegaly present. No lymphadenopathy present. Cardiovascular: Normal rate. Normal heart sounds. Exam reveals no gallop and no friction rub. No murmur heard. Pulmonary/Chest: Effort normal and breath sounds normal. No respiratory distress. She has no wheezes, rhonchi or rales. Oxygen therapy in place per NC 2L     Abdominal: Soft, non-tender. Bowel sounds are normal. She exhibits no organomegaly, mass or bruit. Extremities: No edema, cyanosis, or clubbing. Pulses are 2+ radial/dorsalis pedis/posterior tibial/carotid bilaterally. Neurological: Awake  alert and orientedNo gross cranial nerve deficit. Coordination normal.     Skin: Skin is warm and dry. There is no rash or diaphoresis. Psychiatric: She has a normal mood and affect. Her speech is normal and behavior is normal.     Lab Review: all reviewed   Lab Results   Component Value Date/Time    TRIG 288 03/02/2022 12:00 AM    HDL 33 03/02/2022 12:00 AM    LDLCALC 83 03/02/2022 12:00 AM    LDLDIRECT 136 10/03/2018 03:08 PM    LABVLDL 48 03/02/2022 12:00 AM     Lab Results   Component Value Date/Time    BUN 17 06/17/2022 12:59 PM    CREATININE 0.7 06/17/2022 12:59 PM     EKG Interpretation: 7/5/16 SR with no acute changes     11/4/19 Sinus Rhythm Poor R-wave progression -nonspecific    consider old anterior infarct. 9/17/21 Sinus rhythm     Image Review: all reviewed     ECHO:4/7/14  Summary  Normal left ventricle size and systolic function with an estimated ejection fraction of 50-55%. No regional wall motion abnormalities are seen.   There is reversal of E/A inflow velocities across the mitral valve  suggesting impaired left ventricular relaxation. Mitral annular calcification is present. Mild mitral regurgitation is present. Normal right ventricular size and function. Cath:12/1/2014  INTERVENTIONS PERFORMED: We performed intervention of the 1st diagonal branch. It had already stent present which had severe in-stent restenosis, approximately 99% degree severity with ELVIS-2 flow distally. This was post  dilated with a 2.0 x 15 mm balloon angioplasty cannula followed by placement of Xience drug-eluting stents, 2.25 x 20 mm and 2.25 by 12 mm. Both stents were inflated to final stent diameter of 2.48 mm. Cath:1/20/15  INTERVENTION: _RCA__ Artery intervened upon. The lesion was successfully intervened. Post-stenosis of 0%, post-ELVIS 3 flow and TMP grade 3. The vessel was accessed natively. The following items were used: Stent used 3.0x 15 mm TOMMY Xience Alpine stent. SUMMARY: RCA PCI    Cardiac cath 6/17/2022  ANGIOGRAM/CORONARY ARTERIOGRAM:        The left main coronary artery is normal .     The left anterior descending artery is normal .              D1 99% instent stenosis      The left circumflex artery is normal .     The right coronary artery has mild disease, mid stent is widely patent . INTERVENTION  Guide catheter: 5F XB 3.5  Runthrough wire used to cross Diag  POBA w/ 2.5 NC balloon to 18 ACE      SUMMARY:   Single vessel CAD  S/p successful angioplasty of D1 stent   Assessment/Plan:     Coronary artery disease  No further chest pain or angina like symptoms. --12/1/14 1st diagonal branch, stent with in stent restenosis. PCI with Xience TOMMY and both stents inflated. --1/20/15 PCI to the RCA. S/p successful angioplasty of D1 stent   Asymptomatic. Continue Asa, Brilinta, B-blocker and statin therapy. Diastolic Dysfunction  Reviewed last echo 2014. Appears compensated today except scattered wheezes. She is scheduled for a repeat echocardiogram in September. Continue Ace-I and B-blokcer. Hyperlipidemia  Continue statin therapy. Smoking addiction  She continues smoking daily. I have again stressed the importance of smoking cessation and spent 3-5 minutes discussing. COPD:   Dr. Fabián Morillo. BiPap nightly and oxygen therapy per NC. Continues with routine follow ups. DM:   Managed per Dr. Carie Adame. Keep follow in September to discuss echo results. Thank you very much for allowing me to participate in the care of your patient. Please do not hesitate to contact me if you have any questions. Sincerely,    Mary Ann Butcher MD, MPH      [place] [address]  Ph: [phone]  Fax: [phone]      This note was scribed in the presence of Dr Gale Miller, by Sally Tirado RN  Physician Attestation:  The scribes documentation has been prepared under my direction and personally reviewed by me in its entirety. I confirm that the note above accurately reflects all work, treatment, procedures, and medical decision making performed by me.

## 2022-07-28 ENCOUNTER — OFFICE VISIT (OUTPATIENT)
Dept: CARDIOLOGY CLINIC | Age: 62
End: 2022-07-28
Payer: MEDICARE

## 2022-07-28 VITALS
SYSTOLIC BLOOD PRESSURE: 120 MMHG | WEIGHT: 211 LBS | DIASTOLIC BLOOD PRESSURE: 76 MMHG | HEIGHT: 64 IN | HEART RATE: 75 BPM | OXYGEN SATURATION: 96 % | BODY MASS INDEX: 36.02 KG/M2

## 2022-07-28 DIAGNOSIS — F17.200 SMOKING ADDICTION: ICD-10-CM

## 2022-07-28 DIAGNOSIS — I25.10 CORONARY ARTERY DISEASE INVOLVING NATIVE CORONARY ARTERY OF NATIVE HEART WITHOUT ANGINA PECTORIS: Primary | ICD-10-CM

## 2022-07-28 DIAGNOSIS — E78.2 MIXED HYPERLIPIDEMIA: ICD-10-CM

## 2022-07-28 DIAGNOSIS — I51.89 DIASTOLIC DYSFUNCTION: ICD-10-CM

## 2022-07-28 PROCEDURE — 99214 OFFICE O/P EST MOD 30 MIN: CPT | Performed by: INTERNAL MEDICINE

## 2022-07-28 PROCEDURE — 93000 ELECTROCARDIOGRAM COMPLETE: CPT | Performed by: INTERNAL MEDICINE

## 2022-09-09 RX ORDER — ATORVASTATIN CALCIUM 40 MG/1
40 TABLET, FILM COATED ORAL DAILY
Qty: 90 TABLET | Refills: 3 | Status: SHIPPED | OUTPATIENT
Start: 2022-09-09

## 2022-09-09 NOTE — TELEPHONE ENCOUNTER
Last OV:2022  Last Labs:2022  Last Refills:2022  Next Appt:2022  Last EK2022      atorvastatin (LIPITOR) 40 MG

## 2022-09-20 ENCOUNTER — TELEPHONE (OUTPATIENT)
Dept: CARDIOLOGY CLINIC | Age: 62
End: 2022-09-20

## 2022-09-20 NOTE — TELEPHONE ENCOUNTER
Last OV:2022  Last Labs:2022  Last Refills:2022  Next Appt:2022    Last EK2022      Medication Refill     Medication needing refilled:metoprolol succinate (TOPROL XL)     Dosage of the medication: 100 MG extended release tablet

## 2022-09-20 NOTE — TELEPHONE ENCOUNTER
Medication Refill    Medication needing refilled:metoprolol succinate (TOPROL XL)    Dosage of the medication: 100 MG extended release tablet      How are you taking this medication (QD, BID, TID, QID, PRN):  Take 1 tablet by mouth daily    30 or 90 day supply called in: 90    When will you run out of your medication:  09/19/2022     Which Pharmacy are we sending the medication to?:   [place] [address] [address]   [address] [facility] [address] [address]   Phone:  [phone]  Fax:  [phone]

## 2022-09-21 RX ORDER — METOPROLOL SUCCINATE 100 MG/1
100 TABLET, EXTENDED RELEASE ORAL DAILY
Qty: 90 TABLET | Refills: 3 | Status: SHIPPED | OUTPATIENT
Start: 2022-09-21

## 2022-12-15 RX ORDER — BUDESONIDE AND FORMOTEROL FUMARATE DIHYDRATE 160; 4.5 UG/1; UG/1
2 AEROSOL RESPIRATORY (INHALATION) 2 TIMES DAILY
COMMUNITY
End: 2022-12-15 | Stop reason: ALTCHOICE

## 2022-12-15 RX ORDER — LEVOTHYROXINE SODIUM 0.05 MG/1
50 TABLET ORAL DAILY
COMMUNITY
End: 2022-12-15 | Stop reason: ALTCHOICE

## 2022-12-15 NOTE — PROGRESS NOTES
[facility] time___0945_________        Surgery time__1115__________    Take the following medications with a sip of water: Follow your MD/Surgeons pre-procedure instructions regarding your medications     Do not eat or drink anything after 12:00 midnight prior to your surgery. This includes water chewing gum, mints and ice chips. You may brush your teeth and gargle the morning of your surgery, but do not swallow the water     Please see your family doctor/pediatrician for a history and physical and/or concerning medications. Bring any test results/reports from your physicians office. If you are under the care of a heart doctor or specialist doctor, please be aware that you may be asked to them for clearance    You may be asked to stop blood thinners such as Coumadin, Plavix, Fragmin, Lovenox, etc., or any anti-inflammatories such as:  Aspirin, Ibuprofen, Advil, Naproxen prior to your surgery. We also ask that you stop any OTC medications such as fish oil, vitamin E, glucosamine, garlic, Multivitamins, COQ 10, etc.    We ask that you do not smoke 24 hours prior to surgery  We ask that you do not  drink any alcoholic beverages 24 hours prior to surgery     You must make arrangements for a responsible adult to take you home after your surgery. For your safety you will not be allowed to leave alone or drive yourself home. Your surgery will be cancelled if you do not have a ride home. Also for your safety, it is strongly suggested that someone stay with you the first 24 hours after your surgery. A parent or legal guardian must accompany a child scheduled for surgery and plan to stay at the hospital until the child is discharged. Please do not bring other children with you. For your comfort, please wear simple loose fitting clothing to the hospital.  Please do not bring valuables.     Do not wear any make-up or nail polish on your fingers or toes      For your safety, please do not wear any jewelry or body piercing's on the day of surgery. All jewelry must be removed. If you have dentures, they will be removed before going to operating room. For your convenience, we will provide you with a container. If you wear contact lenses or glasses, they will be removed, please bring a case for them. If you have a living will and a durable power of  for healthcare, please bring in a copy. As part of our patient safety program to minimize surgical site infections, we ask you to do the following:    Please notify your surgeon if you develop any illness between         now and the  day of your surgery. This includes a cough, cold, fever, sore throat, nausea,         or vomiting, and diarrhea, etc.   Please notify your surgeon if you experience dizziness, shortness         of breath or blurred vision between now and the time of your surgery. Do not shave your operative site 96 hours prior to surgery. For face and neck surgery, men may use an electric razor 48 hours   prior to surgery. You may shower the night before surgery or the morning of   your surgery with an antibacterial soap. You will need to bring a photo ID and insurance card    [facility] has an onsite pharmacy, would you like to utilize our pharmacy     If you will be staying overnight and use a C-pap machine, please bring   your C-pap to hospital     Our goal is to provide you with excellent care, therefore, visitors will be limited to two(2) in the room at a time so that we may focus on providing this care for you. Please contact pre-admission testing if you have any further questions. [facility] phone number:  [address] [facility] fax number:  [phone]  Please note these are generalized instructions for all surgical cases, you may be provided with more specific instructions according to your surgery.     C-Difficile admission screening and protocol:       * Admitted with diarrhea? [] YES    [x]  NO     *Prior history of C-Diff. In last 3 months? [] YES    [x]  NO     *Antibiotic use in the past 6-8 weeks? [x]  NO    []  YES                 If yes, which ANTIBIOTIC AND REASON______     *Prior hospitalization or nursing home in the last month? []  YES    [x]  NO        SAFETY FIRST. .call before you fall

## 2022-12-15 NOTE — PROGRESS NOTES
DOP 22   [birth year]    PATIENT ON BRILLINTA AND ASPIRIN-PATIENT WAS TOLD TO STOP ON 22 FOR HER PROCEDURE-PER PATIENT BULMARO FROM DR. Trevor Sauer OFFICE HAS A CALL OUT TO DR. ECHEVARRIA TO MAKE SURE PATIENT OK TO STOP FOR PROCEDURE-PATIENT HAD RECENT HEART STENT ON 22-CALL PLACED TO DR. Trevor Sauer OFFICE FROM [facility]-SPOKE WITH TU WHO SAID SHE WILL SEND MESSAGE AS URGENT

## 2022-12-19 ENCOUNTER — ANESTHESIA EVENT (OUTPATIENT)
Dept: ENDOSCOPY | Age: 62
End: 2022-12-19
Payer: MEDICARE

## 2022-12-20 ENCOUNTER — ANESTHESIA (OUTPATIENT)
Dept: ENDOSCOPY | Age: 62
End: 2022-12-20
Payer: MEDICARE

## 2022-12-20 ENCOUNTER — HOSPITAL ENCOUNTER (OUTPATIENT)
Age: 62
Setting detail: OUTPATIENT SURGERY
Discharge: HOME | End: 2022-12-20
Attending: INTERNAL MEDICINE | Admitting: INTERNAL MEDICINE
Payer: MEDICARE

## 2022-12-20 ENCOUNTER — TELEPHONE (OUTPATIENT)
Dept: CARDIOLOGY CLINIC | Age: 62
End: 2022-12-20

## 2022-12-20 VITALS
HEART RATE: 76 BPM | WEIGHT: 212.7 LBS | HEIGHT: 64 IN | BODY MASS INDEX: 36.31 KG/M2 | SYSTOLIC BLOOD PRESSURE: 125 MMHG | TEMPERATURE: 98 F | RESPIRATION RATE: 16 BRPM | OXYGEN SATURATION: 95 % | DIASTOLIC BLOOD PRESSURE: 60 MMHG

## 2022-12-20 LAB
GLUCOSE BLD-MCNC: 130 MG/DL (ref 70–99)
GLUCOSE BLD-MCNC: 160 MG/DL (ref 70–99)
PERFORMED ON: ABNORMAL
PERFORMED ON: ABNORMAL
POTASSIUM, WHOLE BLOOD: 4.6 MMOL/L (ref 3.5–5.1)

## 2022-12-20 PROCEDURE — 3700000000 HC ANESTHESIA ATTENDED CARE: Performed by: INTERNAL MEDICINE

## 2022-12-20 PROCEDURE — 2580000003 HC RX 258: Performed by: NURSE ANESTHETIST, CERTIFIED REGISTERED

## 2022-12-20 PROCEDURE — 6360000002 HC RX W HCPCS: Performed by: NURSE ANESTHETIST, CERTIFIED REGISTERED

## 2022-12-20 PROCEDURE — 7100000001 HC PACU RECOVERY - ADDTL 15 MIN: Performed by: INTERNAL MEDICINE

## 2022-12-20 PROCEDURE — 3609017100 HC EGD: Performed by: INTERNAL MEDICINE

## 2022-12-20 PROCEDURE — 2709999900 HC NON-CHARGEABLE SUPPLY: Performed by: INTERNAL MEDICINE

## 2022-12-20 PROCEDURE — 84132 ASSAY OF SERUM POTASSIUM: CPT

## 2022-12-20 PROCEDURE — 36415 COLL VENOUS BLD VENIPUNCTURE: CPT

## 2022-12-20 PROCEDURE — 2500000003 HC RX 250 WO HCPCS: Performed by: NURSE ANESTHETIST, CERTIFIED REGISTERED

## 2022-12-20 PROCEDURE — 3700000001 HC ADD 15 MINUTES (ANESTHESIA): Performed by: INTERNAL MEDICINE

## 2022-12-20 PROCEDURE — 7100000010 HC PHASE II RECOVERY - FIRST 15 MIN: Performed by: INTERNAL MEDICINE

## 2022-12-20 PROCEDURE — 3609008400 HC SIGMOIDOSCOPY DIAGNOSTIC: Performed by: INTERNAL MEDICINE

## 2022-12-20 PROCEDURE — 2580000003 HC RX 258: Performed by: ANESTHESIOLOGY

## 2022-12-20 PROCEDURE — 7100000011 HC PHASE II RECOVERY - ADDTL 15 MIN: Performed by: INTERNAL MEDICINE

## 2022-12-20 PROCEDURE — 7100000000 HC PACU RECOVERY - FIRST 15 MIN: Performed by: INTERNAL MEDICINE

## 2022-12-20 RX ORDER — SODIUM CHLORIDE 0.9 % (FLUSH) 0.9 %
5-40 SYRINGE (ML) INJECTION PRN
Status: DISCONTINUED | OUTPATIENT
Start: 2022-12-20 | End: 2022-12-20 | Stop reason: HOSPADM

## 2022-12-20 RX ORDER — LIDOCAINE HYDROCHLORIDE 20 MG/ML
INJECTION, SOLUTION EPIDURAL; INFILTRATION; INTRACAUDAL; PERINEURAL PRN
Status: DISCONTINUED | OUTPATIENT
Start: 2022-12-20 | End: 2022-12-20 | Stop reason: SDUPTHER

## 2022-12-20 RX ORDER — SODIUM CHLORIDE 9 MG/ML
INJECTION, SOLUTION INTRAVENOUS CONTINUOUS
Status: DISCONTINUED | OUTPATIENT
Start: 2022-12-20 | End: 2022-12-20 | Stop reason: HOSPADM

## 2022-12-20 RX ORDER — SODIUM CHLORIDE 9 MG/ML
INJECTION, SOLUTION INTRAVENOUS PRN
Status: DISCONTINUED | OUTPATIENT
Start: 2022-12-20 | End: 2022-12-20 | Stop reason: HOSPADM

## 2022-12-20 RX ORDER — PROPOFOL 10 MG/ML
INJECTION, EMULSION INTRAVENOUS PRN
Status: DISCONTINUED | OUTPATIENT
Start: 2022-12-20 | End: 2022-12-20 | Stop reason: SDUPTHER

## 2022-12-20 RX ORDER — SODIUM CHLORIDE 9 MG/ML
INJECTION, SOLUTION INTRAVENOUS CONTINUOUS PRN
Status: DISCONTINUED | OUTPATIENT
Start: 2022-12-20 | End: 2022-12-20 | Stop reason: SDUPTHER

## 2022-12-20 RX ORDER — PROPOFOL 10 MG/ML
INJECTION, EMULSION INTRAVENOUS CONTINUOUS PRN
Status: DISCONTINUED | OUTPATIENT
Start: 2022-12-20 | End: 2022-12-20 | Stop reason: SDUPTHER

## 2022-12-20 RX ORDER — SODIUM CHLORIDE 0.9 % (FLUSH) 0.9 %
5-40 SYRINGE (ML) INJECTION EVERY 12 HOURS SCHEDULED
Status: DISCONTINUED | OUTPATIENT
Start: 2022-12-20 | End: 2022-12-20 | Stop reason: HOSPADM

## 2022-12-20 RX ADMIN — PROPOFOL 50 MG: 10 INJECTION, EMULSION INTRAVENOUS at 11:51

## 2022-12-20 RX ADMIN — PROPOFOL 140 MCG/KG/MIN: 10 INJECTION, EMULSION INTRAVENOUS at 11:32

## 2022-12-20 RX ADMIN — PROPOFOL 100 MG: 10 INJECTION, EMULSION INTRAVENOUS at 11:32

## 2022-12-20 RX ADMIN — SODIUM CHLORIDE: 9 INJECTION, SOLUTION INTRAVENOUS at 10:25

## 2022-12-20 RX ADMIN — PROPOFOL 50 MG: 10 INJECTION, EMULSION INTRAVENOUS at 11:41

## 2022-12-20 RX ADMIN — SODIUM CHLORIDE: 9 INJECTION, SOLUTION INTRAVENOUS at 11:26

## 2022-12-20 RX ADMIN — LIDOCAINE HYDROCHLORIDE 100 MG: 20 INJECTION, SOLUTION EPIDURAL; INFILTRATION; INTRACAUDAL; PERINEURAL at 11:32

## 2022-12-20 ASSESSMENT — PAIN - FUNCTIONAL ASSESSMENT
PAIN_FUNCTIONAL_ASSESSMENT: ACTIVITIES ARE NOT PREVENTED
PAIN_FUNCTIONAL_ASSESSMENT: PREVENTS OR INTERFERES SOME ACTIVE ACTIVITIES AND ADLS
PAIN_FUNCTIONAL_ASSESSMENT: ACTIVITIES ARE NOT PREVENTED
PAIN_FUNCTIONAL_ASSESSMENT: 0-10

## 2022-12-20 ASSESSMENT — PAIN DESCRIPTION - ONSET
ONSET: ON-GOING
ONSET: ON-GOING

## 2022-12-20 ASSESSMENT — COPD QUESTIONNAIRES: CAT_SEVERITY: MODERATE

## 2022-12-20 ASSESSMENT — PAIN DESCRIPTION - FREQUENCY
FREQUENCY: INTERMITTENT
FREQUENCY: CONTINUOUS

## 2022-12-20 ASSESSMENT — PAIN DESCRIPTION - PAIN TYPE
TYPE: ACUTE PAIN
TYPE: SURGICAL PAIN

## 2022-12-20 ASSESSMENT — PAIN DESCRIPTION - DESCRIPTORS
DESCRIPTORS: CRAMPING
DESCRIPTORS: CRAMPING
DESCRIPTORS: PATIENT UNABLE TO DESCRIBE

## 2022-12-20 ASSESSMENT — PAIN SCALES - GENERAL
PAINLEVEL_OUTOF10: 4
PAINLEVEL_OUTOF10: 4

## 2022-12-20 ASSESSMENT — PAIN DESCRIPTION - LOCATION: LOCATION: ABDOMEN

## 2022-12-20 ASSESSMENT — PAIN DESCRIPTION - ORIENTATION: ORIENTATION: LOWER

## 2022-12-20 NOTE — TELEPHONE ENCOUNTER
Daniel France called in wanting to be advised on whether to take her blood thinner after not having her colonoscopy today 12/20. She took her blood thinner by accident this morning and had to cancel her procedure. Daniel France wants to know if she should start back on them although Dr. Colleen Avalos wants her to be removed from the medication as they believe that she is bleeding internally. Please advise Daniel France, she can be reached at: [phone].     (Did not see a cardiac clearance in her chart for the scheduled colonoscopy.)

## 2022-12-20 NOTE — OP NOTE
Endoscopy Note    Patient: Tawnya Monday  : 1960  Acct#:     Procedure: Esophagogastroduodenoscopy                       Flexible sigmoidoscopy    Date:  2022     Surgeon:   Mike Cagle MD    Referring Physician:  Freddy Pollard MD    Indications: This is a 65 y.o. female  who presents for EGD and colonoscopy due to anemia without overt bleeding. Postoperative Diagnosis:    Linear gastric erythema, consistent with GAVE  Flexible sigmoidoscopy with many polyps in the descending and sigmoid colon, 3-12 mm in size, not resected due to Brilinta use. 3 mm rectal polyp    Anesthesia:  MAC    Consent:  The patient or their legal guardian has signed a consent, and is aware of the potential risks, benefits, alternatives, and potential complications of this procedure. These include, but are not limited to hemorrhage, bleeding, post procedural pain, perforation, phlebitis, aspiration, hypotension, hypoxia, cardiovascular events such as arryhthmia, and possibly death. Additionally, the possibility of missed colonic polyps and interval colon cancer was discussed in the consent. Description of Procedure: The patient was then taken to the endoscopy suite, placed in the left lateral decubitus position and the above IV sedation was administrered. The Olympus video endoscope was placed through the patient's oropharynx without difficulty to the extent of the 2nd portion of the duodenum. Both forward and retroflexed views of the stomach were obtained. Findings:    Esophagus: The esophagus appeared normal without evidence of Burgos's esophagus or reflux esophagitis. The Z line was located 38 cm from the incisors. Stomach: The stomach was examined on forward and retroflexed views, and ntoable for linear erythema, consistent with mild GAVE.   Duodenum: The first and 2nd portions of the duodenum appeared normal with normal villous pattern    The scope was then withdrawn back into the stomach, it was decompressed, and the scope was completely withdrawn. A digital rectal examination was performed and revealed negative without mass, lesions or tenderness. The Olympus video colonoscope was placed in the patient's rectum under digital direction and advanced to the transverse colon. The colon was redundant and looping, and due to polyps seen and Brilinta use further advancement to the cecum, as well as polypectomy was not performed. The prep was fair. The scope was then withdrawn back through the cecum, ascending, transverse, descending, sigmoid colon, and rectum. Careful circumferential examination of the mucosa in these areas demonstrated many polyps in the descending and sigmoid colon, 3-12 mm in size, not resected due to Brilinta use. A 3 mm rectal polyp was seen and not resected. The scope was then withdrawn into the rectum and retroflexed. The retroflexed view of the anal verge and rectum demonstrates internal hemorrhoids. The scope was straightened, the colon was decompressed and the scope was withdrawn from the patient. The patient tolerated the procedure well and was taken to the PACU in good condition. Estimated Blood Loss: None    Impression:    1) See post procedure diagnoses    Recommendations:   - Follow-up pathology results in 7 days, by calling the office at [phone].  - Resume regular medications. - Resume diet as tolerated. - Repeat colonoscopy with holding Brilinta as instructed for 5 days.     JEMIMA Abdi [address] [facility]

## 2022-12-20 NOTE — ANESTHESIA PRE PROCEDURE
[facility] Department of Anesthesiology  Pre-Anesthesia Evaluation/Consultation       Name:  Argenis Rose  : 1960  Age:  65 y.o.                                            MRN:  [id number]  Date: 2022           Surgeon: Surgeon(s):  Shin Mayo MD    Procedure: Procedure(s):  ESOPHAGOGASTRODUODENOSCOPY  COLONOSCOPY     Allergies   Allergen Reactions    Ciprofloxacin Anaphylaxis and Swelling     Pt states throat swells    Sulfa Antibiotics Anaphylaxis and Swelling     Pt states throat swells and she gets rash     Patient Active Problem List   Diagnosis    Influenza A with respiratory manifestations    Type II or unspecified type diabetes mellitus without mention of complication, uncontrolled    Mixed hyperlipidemia    Depression    Hypertension    Pain in joint, multiple sites    Osteoarthritis    Other specified disorder of the esophagus    Sleep apnea    Chest pain    CAD (coronary artery disease)    Chronic hypoxemic respiratory failure (HCC)    Respiratory insufficiency    Chronic respiratory failure with hypoxia (HCC)    Smoking addiction    TR on CPAP    TR treated with BiPAP    Moderate COPD (chronic obstructive pulmonary disease) (HCC)    Rhinosinusitis    Diarrhea    Type 2 diabetes mellitus with hyperglycemia (HCC)    Anxiety    Tachycardia    Bilateral leg pain    Heart murmur    Diastolic dysfunction    Claudication (HCC)    End stage renal disease (HCC)    Morbid obesity (HCC)    Opioid use, unspecified with unspecified opioid-induced disorder    Opioid dependence with unspecified opioid-induced disorder    Opioid dependence, uncomplicated     Past Medical History:   Diagnosis Date    Anxiety     Arthritis     CAD (coronary artery disease)     Cancer (HCC)     cervical cancer - cone biopsy done    COPD (chronic obstructive pulmonary disease) (HCC)     Depression     Diabetes mellitus (HCC)     type II, controlled with pills, not currently on insulin    GERD (gastroesophageal reflux disease)     Hyperlipidemia     Hypertension     Hypothyroidism     Influenza A 2014    Movement disorder     muscle spasms    Oxygen deficit     2L continu    Pneumonia     Psychiatric problem     anxiety and depression    Sleep apnea     USES BI-PAP    Thyroid trouble     Tobacco abuse     Type II or unspecified type diabetes mellitus without mention of complication, not stated as uncontrolled      Past Surgical History:   Procedure Laterality Date    ABDOMEN SURGERY       x 2    CARDIAC SURGERY      stents  and     CERVIX BIOPSY      cone    CHOLECYSTECTOMY      COLONOSCOPY  2018    CORONARY ANGIOPLASTY WITH STENT PLACEMENT  2022    LEEP      abnormal cells (cancerous)     Social History     Tobacco Use    Smoking status: Every Day     Packs/day: 1.00     Years: 43.00     Pack years: 43.00     Types: Cigarettes     Start date: 1973    Smokeless tobacco: Never    Tobacco comments:     cessation 2/15, she was cutting down   Vaping Use    Vaping Use: Never used   Substance Use Topics    Alcohol use: No    Drug use: Never     Medications  No current facility-administered medications on file prior to encounter. Current Outpatient Medications on File Prior to Encounter   Medication Sig Dispense Refill    HYDROcodone-acetaminophen (NORCO) 5-325 MG per tablet Take 1 tablet by mouth 2 times daily as needed for Pain for up to 30 days.  55 tablet 0    diazePAM (VALIUM) 5 MG tablet TAKE 1 TABLET BY MOUTH THREE TIMES DAILY AS NEEDED 90 tablet 1    atorvastatin (LIPITOR) 40 MG tablet TAKE 1 TABLET BY MOUTH DAILY (Patient taking differently: Take 40 mg by mouth at bedtime) 90 tablet 3    B-D ULTRAFINE III SHORT PEN 31G X 8 MM MISC USE 1 NEEDLE DAILY AS DIRECTED 100 each 2    metFORMIN (GLUCOPHAGE) 1000 MG tablet TAKE 1 TABLET BY MOUTH TWICE DAILY 60 tablet 3    omeprazole (PRILOSEC) 40 MG delayed release capsule TAKE 1 CAPSULE BY MOUTH DAILY 30 capsule 2    sertraline (ZOLOFT) 100 MG tablet TAKE 2 TABLETS BY MOUTH EVERY NIGHT AT BEDTIME 60 tablet 2    fenofibrate (TRICOR) 145 MG tablet TAKE 1 TABLET BY MOUTH DAILY 30 tablet 2    BRILINTA 90 MG TABS tablet TAKE 1 TABLET BY MOUTH TWICE DAILY 60 tablet 2    insulin aspart (NOVOLOG) 100 UNIT/ML injection vial ADMINISTER UP TO 70 UNITS UNDER THE SKIN EVERY DAY AS DIRECTED PER SLIDING SCALE (Patient taking differently: Inject 8 Units into the skin 3 times daily (before meals) AS DIRECTED PER SLIDING SCALE) 10 mL 2    buPROPion (WELLBUTRIN XL) 150 MG extended release tablet TAKE 1 TABLET BY MOUTH EVERY MORNING 30 tablet 2    levothyroxine (SYNTHROID) 50 MCG tablet TAKE 1 TABLET BY MOUTH DAILY 90 tablet 0    metoprolol succinate (TOPROL XL) 100 MG extended release tablet Take 1 tablet by mouth daily (Patient taking differently: Take 100 mg by mouth at bedtime) 90 tablet 3    Insulin Syringe-Needle U-100 (INSULIN SYRINGE .5CC/30GX5/16\") 30G X 5/16\" 0.5 ML MISC USE THREE TIMES DAILY 272 each 2    cetirizine (ZYRTEC) 10 MG tablet TAKE 1 TABLET BY MOUTH DAILY (Patient taking differently: Take 10 mg by mouth at bedtime) 30 tablet 1    insulin aspart (NOVOLOG FLEXPEN) 100 UNIT/ML injection pen Inject 8 Units into the skin 3 times daily (before meals) Along with sliding scale      insulin glargine (LANTUS) 100 UNIT/ML injection vial Inject 60 Units into the skin nightly      lisinopril (PRINIVIL;ZESTRIL) 20 MG tablet TAKE 1 TABLET BY MOUTH DAILY.  30 tablet 5    tiotropium (SPIRIVA HANDIHALER) 18 MCG inhalation capsule INHALE THE CONTENTS OF 1 CAPSULE VIA INHALATION DEVICE EVERY DAY 30 capsule 5    budesonide-formoterol (SYMBICORT) 160-4.5 MCG/ACT AERO INHALE 2 PUFFS BY MOUTH TWICE DAILY 10.2 g 5    albuterol (PROVENTIL) (2.5 MG/3ML) 0.083% nebulizer solution Take 3 mLs by nebulization every 4 hours as needed for Wheezing 360 mL 11    aspirin 81 MG tablet Take 81 mg by mouth daily      OXYGEN Inhale into the lungs 2L continious       No current facility-administered medications for this encounter. Vital Signs (Current)   Vitals:    12/15/22 0931   Weight: 213 lb (96.6 kg)   Height: 5' 4\" (1.626 m)                                            Vital Signs Statistics (for past 48 hrs)     No data recorded  BP Readings from Last 3 Encounters:   12/13/22 130/78   09/13/22 130/78   07/28/22 120/76       BMI  Body mass index is 36.56 kg/m². Estimated body mass index is 36.56 kg/m² as calculated from the following:    Height as of this encounter: 5' 4\" (1.626 m). Weight as of this encounter: 213 lb (96.6 kg).     CBC   Lab Results   Component Value Date/Time    WBC 10.1 12/13/2022 01:42 PM    RBC 4.71 12/13/2022 01:42 PM    HGB 9.8 12/13/2022 01:42 PM    HCT 33.0 12/13/2022 01:42 PM    MCV 70 12/13/2022 01:42 PM    RDW 15.2 12/13/2022 01:42 PM     12/13/2022 01:42 PM     CMP    Lab Results   Component Value Date/Time     12/13/2022 01:42 PM    K 5.6 12/13/2022 01:42 PM    K 4.2 08/16/2018 05:12 PM     12/13/2022 01:42 PM    CO2 23 12/13/2022 01:42 PM    BUN 22 12/13/2022 01:42 PM    CREATININE 0.77 12/13/2022 01:42 PM    GFRAA >60 09/13/2022 01:46 PM    GFRAA >60 11/20/2012 08:35 AM    AGRATIO 1.6 12/13/2022 01:42 PM    LABGLOM >60 09/13/2022 01:46 PM    LABGLOM 84 06/03/2015 12:00 AM    GLUCOSE 127 12/13/2022 01:42 PM    PROT 6.9 12/13/2022 01:42 PM    PROT 7.1 11/20/2012 08:35 AM    CALCIUM 9.7 12/13/2022 01:42 PM    BILITOT <0.2 12/13/2022 01:42 PM    ALKPHOS 44 12/13/2022 01:42 PM    AST 24 12/13/2022 01:42 PM    ALT 20 12/13/2022 01:42 PM     BMP    Lab Results   Component Value Date/Time     12/13/2022 01:42 PM    K 5.6 12/13/2022 01:42 PM    K 4.2 08/16/2018 05:12 PM     12/13/2022 01:42 PM    CO2 23 12/13/2022 01:42 PM    BUN 22 12/13/2022 01:42 PM    CREATININE 0.77 12/13/2022 01:42 PM    CALCIUM 9.7 12/13/2022 01:42 PM    GFRAA >60 09/13/2022 01:46 PM    GFRAA >60 11/20/2012 08:35 AM    LABGLOM >60 09/13/2022 01:46 PM    LABGLOM 84 06/03/2015 12:00 AM    GLUCOSE 127 12/13/2022 01:42 PM     POCGlucose  No results for input(s): GLUCOSE in the last 72 hours. Coags    Lab Results   Component Value Date/Time    PROTIME 9.9 12/02/2014 04:22 AM    INR 0.92 12/02/2014 04:22 AM    APTT 28.2 06/17/2022 10:03 AM     HCG (If Applicable)   Lab Results   Component Value Date    PREGTESTUR Negative 08/16/2018      ABGs   Lab Results   Component Value Date/Time    PHART 7.364 02/12/2015 10:45 PM    PO2ART 45.1 02/12/2015 10:45 PM    GGX2MBS 42.9 02/12/2015 10:45 PM    VXI6TXR 23.8 02/12/2015 10:45 PM    BEART -1.1 02/12/2015 10:45 PM    M3CTVYPM 81.8 02/12/2015 10:45 PM      Type & Screen (If Applicable)  No results found for: LABABO, LABRH                         BMI: Wt Readings from Last 3 Encounters:       NPO Status:  >8h                          Anesthesia Evaluation  Patient summary reviewed no history of anesthetic complications:   Airway: Mallampati: II  TM distance: >3 FB   Neck ROM: full  Mouth opening: > = 3 FB   Dental:    (+) upper dentures      Pulmonary: breath sounds clear to auscultation  (+) COPD: moderate,  sleep apnea:                            ROS comment: 2L O2 at all times   Cardiovascular:  Exercise tolerance: good (>4 METS),   (+) hypertension:, CAD:,     (-)  angina and no hyperlipidemia        Rate: normal                    Neuro/Psych:   (+) psychiatric history:   (-) seizures, TIA and CVA           GI/Hepatic/Renal:   (+) GERD:, PUD,      (-) no renal disease and no morbid obesity       Endo/Other:    (+) DiabetesType II DM, , hypothyroidism: arthritis: OA., .                 Abdominal:             Vascular:     - DVT and PE. Other Findings:           Anesthesia Plan      MAC     ASA 3       Induction: intravenous. Anesthetic plan and risks discussed with patient. Plan discussed with CRNA.                     This pre-anesthesia assessment may be used as a history and physical.    DOS STAFF ADDENDUM:    Pt seen and examined, chart reviewed (including anesthesia, drug and allergy history). No interval changes to history and physical examination. Anesthetic plan, risks, benefits, alternatives, and personnel involved discussed with patient. Patient verbalized an understanding and agrees to proceed.       Veronica Landa MD  December 20, 2022  9:48 AM

## 2022-12-20 NOTE — ANESTHESIA POSTPROCEDURE EVALUATION
Department of Anesthesiology  Postprocedure Note    Patient: Shama Mcdaniel  MRN: [id number]  YOB: 1960  Date of evaluation: 12/20/2022      Procedure Summary     Date: 12/20/22 Room / Location: [address] [place] [address]    Anesthesia Start: 1126 Anesthesia Stop: 1209    Procedures:       ESOPHAGOGASTRODUODENOSCOPY      FLEXIBLE SIGMOIDOSCOPY Diagnosis:       Anemia, unspecified type      (anemia)    Surgeons: Leeanna Martinez MD Responsible Provider: Angle Mcgrath MD    Anesthesia Type: MAC ASA Status: 3          Anesthesia Type: No value filed.     Jessica Phase I: Jessica Score: 9    Jessica Phase II: Jessica Score: 10      Anesthesia Post Evaluation    Patient location during evaluation: PACU  Patient participation: complete - patient participated  Level of consciousness: awake and alert  Airway patency: patent  Nausea & Vomiting: no nausea and no vomiting  Complications: no  Cardiovascular status: hemodynamically stable  Respiratory status: acceptable  Hydration status: stable

## 2022-12-20 NOTE — H&P
Pre-operative History and Physical    Patient: Wellington Dorantes  : 1960  Acct#:     Intended Procedure:  EGD and colonoscopy     HISTORY OF PRESENT ILLNESS:  The patient is a 65 y.o. female  who presents for EGD and colonoscopy due to anemia without overt bleeding. Past Medical History:        Diagnosis Date    Anxiety     Arthritis     CAD (coronary artery disease)     Cancer ([facility]     cervical cancer - cone biopsy done    COPD (chronic obstructive pulmonary disease) (HCC)     Depression     Diabetes mellitus ([facility]     type II, controlled with pills, not currently on insulin    GERD (gastroesophageal reflux disease)     Hyperlipidemia     Hypertension     Hypothyroidism     Influenza A 2014    Movement disorder     muscle spasms    Oxygen deficit     2L continu    Pneumonia     Psychiatric problem     anxiety and depression    Sleep apnea     USES BI-PAP    Thyroid trouble     Tobacco abuse     Type II or unspecified type diabetes mellitus without mention of complication, not stated as uncontrolled      Past Surgical History:        Procedure Laterality Date    ABDOMEN SURGERY       x 2    CARDIAC SURGERY      stents  and     CERVIX BIOPSY      cone    CHOLECYSTECTOMY      COLONOSCOPY  2018    CORONARY ANGIOPLASTY WITH STENT PLACEMENT  2022    LEEP      abnormal cells (cancerous)     Medications Prior to Admission:   No current facility-administered medications on file prior to encounter. Current Outpatient Medications on File Prior to Encounter   Medication Sig Dispense Refill    HYDROcodone-acetaminophen (NORCO) 5-325 MG per tablet Take 1 tablet by mouth 2 times daily as needed for Pain for up to 30 days.  55 tablet 0    diazePAM (VALIUM) 5 MG tablet TAKE 1 TABLET BY MOUTH THREE TIMES DAILY AS NEEDED 90 tablet 1    atorvastatin (LIPITOR) 40 MG tablet TAKE 1 TABLET BY MOUTH DAILY (Patient taking differently: Take 40 mg by mouth at bedtime) 90 tablet 3    B-D BY MOUTH TWICE DAILY 10.2 g 5    albuterol (PROVENTIL) (2.5 MG/3ML) 0.083% nebulizer solution Take 3 mLs by nebulization every 4 hours as needed for Wheezing 360 mL 11    aspirin 81 MG tablet Take 81 mg by mouth daily      OXYGEN Inhale into the lungs 2L continious          Allergies:  Ciprofloxacin and Sulfa antibiotics    Social History:   TOBACCO:   reports that she has been smoking cigarettes. She started smoking about 50 years ago. She has a 43.00 pack-year smoking history. She has never used smokeless tobacco.  ETOH:   reports no history of alcohol use. DRUGS:   reports no history of drug use. PHYSICAL EXAM:      Vital Signs: /62   Pulse 78   Temp 98.1 °F (36.7 °C) (Oral)   Resp 18   Ht 5' 4\" (1.626 m)   Wt 212 lb 11.2 oz (96.5 kg)   SpO2 95%   BMI 36.51 kg/m²    Airway: No stridor or wheezing noted. Good air movement  Pulmonary: without wheezes. Clear to auscultation  Cardiac:regular rate and rhythm without loud murmurs  Abdomen:soft, nontender,  Bowel sounds present    Pre-Procedure Assessment / Plan:  1) EGD and colonoscopy    ASA Grade:  ASA 3 - Patient with moderate systemic disease with functional limitations  Mallampati Classification:  Class III    Level of Sedation Plan: MAC    Post Procedure plan: Return to same level of care    I assessed the patient and find that the patient is in satisfactory condition to proceed with the planned procedure and sedation plan. I have explained the risk, benefits, and alternatives to the procedure; the patient understands and agrees to proceed.        Nidia Velez MD  12/20/2022

## 2022-12-20 NOTE — TELEPHONE ENCOUNTER
Called and spoke to patient and let her know that she should contact GI and find out if it is safe for her to resume the medication. And they should contact us with any questions. She is going to contact Dr Daniel Orr office today and find out.

## 2022-12-29 NOTE — FLOWSHEET NOTE
Preoperative Screening for Elective Surgery/Invasive Procedures While COVID-19 present in the community     Have you tested positive or have been told to self-isolate for COVID-19 like symptoms within the past 28 days? Do you currently have any of the following symptoms? Fever >100.0 F or 99.9 F in immunocompromised patients? New onset cough, shortness of breath or difficulty breathing? New onset sore throat, myalgia (muscle aches and pains), headache, loss of taste/smell or diarrhea? Have you had a potential exposure to COVID-19 within the past 14 days by:  Close contact with a confirmed case? Close contact with a healthcare worker,  or essential infrastructure worker (grocery store, [employer], gas station, public utilities or transportation)? Do you reside in a congregate setting such as; skilled nursing facility, adult home, correctional facility, homeless shelter or other institutional setting? Have you had recent travel to a known COVID-19 hotspot? No to all above     * Admitted with diarrhea? [] YES    [x]  NO     *Prior history of C-Diff. In last 3 months? [] YES    [x]  NO     *Antibiotic use in the past 6-8 weeks? [x]  NO    []  YES      If yes, which: REASON_________________     *Prior hospitalization or nursing home in the last month? []  YES    [x]  NO     SAFETY FIRST. .call before you fall    [address] [facility] time_1/3/23 0730___________        Surgery time__0900__________    Do not eat anything after 12:00 midnight prior to your surgery. nothing to drink within 5 hours of procedure. This includes water chewing gum, mints and ice chips- the Day of Surgery. You may brush your teeth and gargle the morning of your surgery, but do not swallow the water     Please see your family doctor/pediatrician for a history and physical and/or questions concerning medications.    Bring any test results/reports from your physicians office. If you are under the care of a heart doctor or specialist doctor, please be aware that you may be asked to them for clearance    You may be asked to stop blood thinners such as Coumadin, Plavix, Fragmin, Lovenox, etc., or any anti-inflammatories such as:  Aspirin, Ibuprofen, Advil, Naproxen prior to your surgery. We also ask that you stop any OTC medications such as fish oil, vitamin E, glucosamine, garlic, Multivitamins, COQ 10, etc.    We ask that you do not smoke 24 hours prior to surgery  We ask that you do not  drink any alcoholic beverages 24 hours prior to surgery     You must make arrangements for a responsible adult to take you home after your surgery. For your safety you will not be allowed to leave alone or drive yourself home. Your surgery will be cancelled if you do not have a ride home. Also for your safety, it is strongly suggested that someone stay with you the first 24 hours after your surgery. A parent or legal guardian must accompany a child scheduled for surgery and plan to stay at the hospital until the child is discharged. Please do not bring other children with you. For your comfort, please wear simple loose fitting clothing to the hospital.  Please do not bring valuables. Do not wear any make-up or nail polish on your fingers or toes. For your safety, please do not wear any jewelry or body piercing's on the day of surgery. All jewelry must be removed. If you have dentures, they will be removed before going to operating room. For your convenience, we will provide you with a container. If you wear contact lenses or glasses, they will be removed, please bring a case for them. If you have a living will and a durable power of  for healthcare, please bring in a copy.      As part of our patient safety program to minimize surgical site infections, we ask you to do the following:    Please notify your surgeon if you develop any illness between         now and the day of your surgery. This includes a cough, cold, fever, sore throat, nausea,         or vomiting, and diarrhea, etc.   Please notify your surgeon if you experience dizziness, shortness         of breath or blurred vision between now and the time of your surgery. Do not shave your operative site 96 hours prior to surgery. For face and neck surgery, men may use an electric razor 48 hours   prior to surgery. You may shower the night before surgery or the morning of   your surgery with an antibacterial soap. You will need to bring a photo ID and insurance card     If you use a C-pap or Bi-pap machine, please bring your machine with you to the hospital     Our goal is to provide you with excellent care, therefore, visitors will be limited to so that we may focus on providing this care for you. Please contact your surgeon office, if you have any further questions. [facility] phone number:  [phone] PAT fax number:  [phone]    Please note these are generalized instructions for all surgical cases, you may be provided with more specific instructions according to your surgery.

## 2022-12-30 ENCOUNTER — ANESTHESIA EVENT (OUTPATIENT)
Dept: ENDOSCOPY | Age: 62
End: 2022-12-30
Payer: MEDICARE

## 2023-01-03 ENCOUNTER — ANESTHESIA (OUTPATIENT)
Dept: ENDOSCOPY | Age: 63
End: 2023-01-03
Payer: MEDICARE

## 2023-01-03 ENCOUNTER — HOSPITAL ENCOUNTER (OUTPATIENT)
Age: 63
Setting detail: OUTPATIENT SURGERY
Discharge: HOME | End: 2023-01-03
Attending: INTERNAL MEDICINE | Admitting: INTERNAL MEDICINE
Payer: MEDICARE

## 2023-01-03 VITALS
HEART RATE: 89 BPM | TEMPERATURE: 96.9 F | HEIGHT: 64 IN | WEIGHT: 210.54 LBS | DIASTOLIC BLOOD PRESSURE: 55 MMHG | SYSTOLIC BLOOD PRESSURE: 115 MMHG | BODY MASS INDEX: 35.94 KG/M2 | RESPIRATION RATE: 18 BRPM | OXYGEN SATURATION: 93 %

## 2023-01-03 DIAGNOSIS — D64.9 ANEMIA, UNSPECIFIED TYPE: ICD-10-CM

## 2023-01-03 LAB
GLUCOSE BLD-MCNC: 146 MG/DL (ref 70–99)
GLUCOSE BLD-MCNC: 167 MG/DL (ref 70–99)
PERFORMED ON: ABNORMAL
PERFORMED ON: ABNORMAL
POTASSIUM, WHOLE BLOOD: 4 MMOL/L (ref 3.5–5.1)

## 2023-01-03 PROCEDURE — 3700000000 HC ANESTHESIA ATTENDED CARE: Performed by: INTERNAL MEDICINE

## 2023-01-03 PROCEDURE — 6370000000 HC RX 637 (ALT 250 FOR IP): Performed by: ANESTHESIOLOGY

## 2023-01-03 PROCEDURE — 2580000003 HC RX 258: Performed by: ANESTHESIOLOGY

## 2023-01-03 PROCEDURE — 2500000003 HC RX 250 WO HCPCS

## 2023-01-03 PROCEDURE — 7100000001 HC PACU RECOVERY - ADDTL 15 MIN: Performed by: INTERNAL MEDICINE

## 2023-01-03 PROCEDURE — 7100000011 HC PHASE II RECOVERY - ADDTL 15 MIN: Performed by: INTERNAL MEDICINE

## 2023-01-03 PROCEDURE — 36415 COLL VENOUS BLD VENIPUNCTURE: CPT

## 2023-01-03 PROCEDURE — 7100000000 HC PACU RECOVERY - FIRST 15 MIN: Performed by: INTERNAL MEDICINE

## 2023-01-03 PROCEDURE — 3700000001 HC ADD 15 MINUTES (ANESTHESIA): Performed by: INTERNAL MEDICINE

## 2023-01-03 PROCEDURE — 7100000010 HC PHASE II RECOVERY - FIRST 15 MIN: Performed by: INTERNAL MEDICINE

## 2023-01-03 PROCEDURE — 6360000002 HC RX W HCPCS

## 2023-01-03 PROCEDURE — 94640 AIRWAY INHALATION TREATMENT: CPT

## 2023-01-03 PROCEDURE — 84132 ASSAY OF SERUM POTASSIUM: CPT

## 2023-01-03 PROCEDURE — 88305 TISSUE EXAM BY PATHOLOGIST: CPT

## 2023-01-03 PROCEDURE — 2709999900 HC NON-CHARGEABLE SUPPLY: Performed by: INTERNAL MEDICINE

## 2023-01-03 PROCEDURE — 3609010600 HC COLONOSCOPY POLYPECTOMY SNARE/COLD BIOPSY: Performed by: INTERNAL MEDICINE

## 2023-01-03 RX ORDER — SODIUM CHLORIDE 0.9 % (FLUSH) 0.9 %
5-40 SYRINGE (ML) INJECTION EVERY 12 HOURS SCHEDULED
Status: DISCONTINUED | OUTPATIENT
Start: 2023-01-03 | End: 2023-01-03 | Stop reason: HOSPADM

## 2023-01-03 RX ORDER — DIPHENHYDRAMINE HYDROCHLORIDE 50 MG/ML
12.5 INJECTION INTRAMUSCULAR; INTRAVENOUS
Status: CANCELLED | OUTPATIENT
Start: 2023-01-03 | End: 2023-01-04

## 2023-01-03 RX ORDER — SODIUM CHLORIDE 9 MG/ML
INJECTION, SOLUTION INTRAVENOUS PRN
Status: CANCELLED | OUTPATIENT
Start: 2023-01-03

## 2023-01-03 RX ORDER — SODIUM CHLORIDE 0.9 % (FLUSH) 0.9 %
5-40 SYRINGE (ML) INJECTION EVERY 12 HOURS SCHEDULED
Status: CANCELLED | OUTPATIENT
Start: 2023-01-03

## 2023-01-03 RX ORDER — PROPOFOL 10 MG/ML
INJECTION, EMULSION INTRAVENOUS PRN
Status: DISCONTINUED | OUTPATIENT
Start: 2023-01-03 | End: 2023-01-03 | Stop reason: SDUPTHER

## 2023-01-03 RX ORDER — LIDOCAINE HYDROCHLORIDE 20 MG/ML
INJECTION, SOLUTION EPIDURAL; INFILTRATION; INTRACAUDAL; PERINEURAL PRN
Status: DISCONTINUED | OUTPATIENT
Start: 2023-01-03 | End: 2023-01-03 | Stop reason: SDUPTHER

## 2023-01-03 RX ORDER — ONDANSETRON 2 MG/ML
4 INJECTION INTRAMUSCULAR; INTRAVENOUS
Status: CANCELLED | OUTPATIENT
Start: 2023-01-03 | End: 2023-01-04

## 2023-01-03 RX ORDER — SODIUM CHLORIDE 0.9 % (FLUSH) 0.9 %
5-40 SYRINGE (ML) INJECTION PRN
Status: DISCONTINUED | OUTPATIENT
Start: 2023-01-03 | End: 2023-01-03 | Stop reason: HOSPADM

## 2023-01-03 RX ORDER — SODIUM CHLORIDE 9 MG/ML
INJECTION, SOLUTION INTRAVENOUS PRN
Status: DISCONTINUED | OUTPATIENT
Start: 2023-01-03 | End: 2023-01-03 | Stop reason: HOSPADM

## 2023-01-03 RX ORDER — LABETALOL HYDROCHLORIDE 5 MG/ML
5 INJECTION, SOLUTION INTRAVENOUS
Status: CANCELLED | OUTPATIENT
Start: 2023-01-03

## 2023-01-03 RX ORDER — IPRATROPIUM BROMIDE AND ALBUTEROL SULFATE 2.5; .5 MG/3ML; MG/3ML
1 SOLUTION RESPIRATORY (INHALATION)
Status: DISCONTINUED | OUTPATIENT
Start: 2023-01-03 | End: 2023-01-03 | Stop reason: HOSPADM

## 2023-01-03 RX ORDER — PROPOFOL 10 MG/ML
INJECTION, EMULSION INTRAVENOUS CONTINUOUS PRN
Status: DISCONTINUED | OUTPATIENT
Start: 2023-01-03 | End: 2023-01-03 | Stop reason: SDUPTHER

## 2023-01-03 RX ORDER — MEPERIDINE HYDROCHLORIDE 25 MG/ML
12.5 INJECTION INTRAMUSCULAR; INTRAVENOUS; SUBCUTANEOUS EVERY 5 MIN PRN
Status: CANCELLED | OUTPATIENT
Start: 2023-01-03

## 2023-01-03 RX ORDER — FENTANYL CITRATE 50 UG/ML
25 INJECTION, SOLUTION INTRAMUSCULAR; INTRAVENOUS EVERY 5 MIN PRN
Status: CANCELLED | OUTPATIENT
Start: 2023-01-03

## 2023-01-03 RX ORDER — SODIUM CHLORIDE 0.9 % (FLUSH) 0.9 %
5-40 SYRINGE (ML) INJECTION PRN
Status: CANCELLED | OUTPATIENT
Start: 2023-01-03

## 2023-01-03 RX ADMIN — PROPOFOL 30 MG: 10 INJECTION, EMULSION INTRAVENOUS at 09:42

## 2023-01-03 RX ADMIN — LIDOCAINE HYDROCHLORIDE 50 MG: 20 INJECTION, SOLUTION EPIDURAL; INFILTRATION; INTRACAUDAL; PERINEURAL at 09:11

## 2023-01-03 RX ADMIN — PROPOFOL 40 MG: 10 INJECTION, EMULSION INTRAVENOUS at 09:11

## 2023-01-03 RX ADMIN — PROPOFOL 30 MG: 10 INJECTION, EMULSION INTRAVENOUS at 09:18

## 2023-01-03 RX ADMIN — PROPOFOL 30 MG: 10 INJECTION, EMULSION INTRAVENOUS at 09:29

## 2023-01-03 RX ADMIN — IPRATROPIUM BROMIDE AND ALBUTEROL SULFATE 1 AMPULE: .5; 2.5 SOLUTION RESPIRATORY (INHALATION) at 10:05

## 2023-01-03 RX ADMIN — PROPOFOL 100 MCG/KG/MIN: 10 INJECTION, EMULSION INTRAVENOUS at 09:12

## 2023-01-03 RX ADMIN — PROPOFOL 30 MG: 10 INJECTION, EMULSION INTRAVENOUS at 09:22

## 2023-01-03 RX ADMIN — PROPOFOL 30 MG: 10 INJECTION, EMULSION INTRAVENOUS at 09:36

## 2023-01-03 RX ADMIN — SODIUM CHLORIDE: 9 INJECTION, SOLUTION INTRAVENOUS at 09:01

## 2023-01-03 ASSESSMENT — PAIN - FUNCTIONAL ASSESSMENT: PAIN_FUNCTIONAL_ASSESSMENT: 0-10

## 2023-01-03 ASSESSMENT — COPD QUESTIONNAIRES: CAT_SEVERITY: SEVERE

## 2023-01-03 ASSESSMENT — LIFESTYLE VARIABLES: SMOKING_STATUS: 0

## 2023-01-03 ASSESSMENT — ENCOUNTER SYMPTOMS: SHORTNESS OF BREATH: 1

## 2023-01-03 NOTE — ANESTHESIA POSTPROCEDURE EVALUATION
Department of Anesthesiology  Postprocedure Note    Patient: Jennifer Ham  MRN: [id number]  YOB: 1960  Date of evaluation: 1/3/2023      Procedure Summary     Date: 01/03/23 Room / Location: [address] [place] [address]    Anesthesia Start: Sophie Mcgregors Anesthesia Stop: 3091    Procedure: COLONOSCOPY POLYPECTOMY SNARE/COLD BIOPSY Diagnosis:       Anemia, unspecified type      (ANEMIA)    Surgeons: Donavan Medellin MD Responsible Provider: Chase Baker MD    Anesthesia Type: MAC ASA Status: 4          Anesthesia Type: No value filed.     Jessica Phase I: Jessica Score: 9    Jessica Phase II: Jessica Score: 8      Anesthesia Post Evaluation    Patient location during evaluation: PACU  Patient participation: complete - patient participated  Level of consciousness: awake  Airway patency: patent  Nausea & Vomiting: no nausea  Complications: no  Cardiovascular status: hemodynamically stable  Respiratory status: acceptable  Hydration status: euvolemic  Multimodal analgesia pain management approach

## 2023-01-03 NOTE — DISCHARGE INSTRUCTIONS
Discharge Instructions for Colonoscopy     Recommendations:    - Follow-up pathology results in 7 days, by calling the office at [phone].  - Restart Brilinta 1/5/23. Resume regular medications. - Resume diet as tolerated. - Given medical co-morbidities including chronic oxygen requirement, in alignment with recommendation from Dr. Art Hayes in 2018, would defer further screening/surveillance. If patient elects to pursue repeat colonoscopy, would recommend in 3 years.   - Recommend iron replacement and repeat CBC. If anemia persists, could consider repeat EGD with APC of GAVE as well as smalle bowel video capsule endoscopy. Colonoscopy is a visual exam of the lining of the large intestine, also called the bowel or colon, with a colonoscope. A colonoscope is a flexible tube with a light and a viewing device. It allows the doctor to view the inside of the colon through a tiny video camera. Colonoscopy is performed for many reasons: unexplained anemia , pain, diarrhea , bloody stools, cancer screening, among many other reasons. Complications from a colonoscopy are rare. Some possible serious complications include perforated bowel (which might require surgery) and bleeding (which could require blood transfusion ). Minor complications include bloating, gas, and cramping that can last for 1-2 days after the procedure. Because air is put into your colon during the procedure, it is normal to pass large amounts of air from your rectum. You may not have a bowel movement for 1-3 days after the procedure. What You Will Need:  Someone to drive you home after the procedure     Steps to Take:  [address] when you get home. Because the sedative will make you drowsy, don't drive, operate machinery, or make important decisions the day of the procedure. Feelings of bloating, gas, or cramping may persist for 24 hours.    Diet -  Try sips of water first. If tolerated, resume bland food (scrambled eggs, toast, soup) first.  If tolerated, resume regular diet or the diet recommended by your physician. Do not drink alcohol for 24 hours. Physical Activity -  Ask your doctor when you will be able to return to work. Do not drive, operate heavy machinery, or do activities that require coordination or balance for 24 hours. Otherwise, return to your normal routine as soon as you are comfortable to do so, which is usually the next day after the procedure. Medications - When taking medications, it's important to: Take your medication as directed, not more, not less, not at a different time. Do not stop taking them without consulting your healthcare provider. Don't share them with anyone else. Know what effects and side effects to expect, and report them to your healthcare provider. If you are taking more than one drug, even if it is an over-the-counter medication, herb, or dietary supplement, be sure to check with a physician or pharmacist about drug interactions. Plan ahead for refills so you don't run out. Lifestyle Changes - The results of your colonoscopy will determine if any lifestyle changes are necessary. Follow-up:  The doctor will usually give you a preliminary report after the medication wears off and you are more alert. The results from a biopsy can take as long as 1-2 weeks to be completed. Schedule a follow-up appointment as directed by your doctor. You should schedule a follow-up colonoscopy as recommended by your doctor. Call Your Doctor If Any of the Following Occurs:  Bleeding from your rectum; notify your doctor if you pass a teaspoonful or more of blood   Black, tarry stools   Severe abdominal pain   Hard, swollen abdomen   Signs of infection, including fever or chills   Inability to pass gas or stool   Coughing, shortness of breath, chest pain, severe nausea or vomiting     In case of an emergency, call 911 immediately.

## 2023-01-03 NOTE — PROGRESS NOTES
Respiratory at bedside for breathing treatment.      Electronically signed by Zeinab Garcia RN on 1/3/2023 at 10:12 AM

## 2023-01-03 NOTE — PROGRESS NOTES
Patient noted to be wheezy sounding. Per patient, wheezing happens every once in awhile. Dr. Jackie Dalton made aware. See orders.      Electronically signed by Kodi Ch RN on 1/3/2023 at 9:58 AM

## 2023-01-03 NOTE — PROGRESS NOTES
Patient admitted to PACU # 7 from [facility] at [phone] post COLONOSCOPY POLYPECTOMY SNARE/COLD BIOPSY per Dr. Chelle Smith. Attached to PACU monitoring system and report received from anesthesia provider. Patient was reported to be hemodynamically stable during procedure. Patient drowsy on admission and denied pain.        Electronically signed by Misael Cheung RN on 1/3/2023 at 9:53 AM

## 2023-01-03 NOTE — OP NOTE
Colonoscopy Procedure Note      Patient: Quinn Oviedo  : 1960  Acct#:     Procedure: Colonoscopy with polypectomy (cold snare)    Date:  1/3/2023    Surgeon:  Yulia Castillo MD    Referring Physician:  Arik Tejeda MD    Previous Colonoscopy: YES  Date:  Dr. Joann Cohen, 2018    Preoperative Diagnosis:  65 y.o. female  who presents for colonoscopy due to iron deficiency and polyps seen on flexible sigmoidoscopy not resected due to Brilinta use. Postoperative Diagnosis:    Six ascending colon polyps, 3-8 mm in size, resected with cold snare  Three sigmoid colon polyps, 4 to 10 mm in size, resected with cold snare  Three rectal polyps, 3-5 mm in size, resected with cold snare  Scattered sigmoid diverticuli    Consent:  The patient or their legal guardian has signed a consent, and is aware of the potential risks, benefits, alternatives, and potential complications of this procedure. These include, but are not limited to hemorrhage, bleeding, post procedural pain, perforation, phlebitis, aspiration, hypotension, hypoxia, cardiovascular events such as arryhthmia, and possibly death. Additionally, the possibility of missed colonic polyps and interval colon cancer was discussed in the consent. Anesthesia:  MAC    Procedure: An informed consent was obtained from the patient after explanation of indications, benefits, possible risks and complications of the procedure. The patient was then taken to the endoscopy suite, placed in the left lateral decubitus position, and the above IV anesthesia was administered. A digital rectal examination was performed and revealed negative without mass, lesions or tenderness. The Olympus video colonoscope was placed in the patient's rectum under digital direction and advanced to the cecum. The cecum was identified by characteristic anatomy and ballottment. The ileocecal valve was identified. The preparation was good.     The terminal ileum was not inspected. The scope was then withdrawn back through the cecum, ascending, transverse, descending, sigmoid colon, and rectum. Careful circumferential examination of the mucosa in these areas demonstrated:    Six ascending colon polyps, 3-8 mm in size, resected with cold snare  Three sigmoid colon polyps, 4 to 10 mm in size, resected with cold snare  Three rectal polyps, 3-5 mm in size, resected with cold snare  Scattered sigmoid diverticuli    The scope was then withdrawn into the rectum and retroflexed. The retroflexed view of the anal verge and rectum demonstrates internal hemorrhoids. The scope was straightened, the colon was decompressed and the scope was withdrawn from the patient. The patient tolerated the procedure well and was taken to the PACU in good condition. Estimated blood loss: Minimal    Impression:  See post-procedure diagnoses. Recommendations:    - Follow-up pathology results in 7 days, by calling the office at [phone].  - Resume regular medications. - Resume diet as tolerated. - Given medical co-morbidities including chronic oxygen requirement, in alignment with recommendation from Dr. Jl Zamora in 2018, would defer further screening/surveillance. If patient elects to pursue repeat colonoscopy, would recommend in 3 years.   - Recommend iron replacement and repeat CBC. If anemia persists, could consider repeat EGD with APC of GAVE as well as smalle bowel video capsule endoscopy.     JEMIMA Chu [address] and [address]  1/3/2023  [phone]

## 2023-01-03 NOTE — PROGRESS NOTES
PACU Transfer Note    Vitals:    01/03/23 1014   BP: (!) 106/53   Pulse: 82   Resp: 22   Temp: 97.3 °F (36.3 °C)   SpO2: 95%       In: 800 [I.V.:800]  Out: -     Pain assessment:  none  Pain Level: 0    Report given to Receiving unit RN.    1/3/2023 10:17 AM    Electronically signed by Nesha Staples RN on 1/3/2023 at 10:17 AM

## 2023-01-03 NOTE — ANESTHESIA PRE PROCEDURE
Department of Anesthesiology  Preprocedure Note       Name:  Richard Pederson   Age:  65 y.o.  :  1960                                          MRN:  [id number]         Date:  1/3/2023      Surgeon: Sanaz Montes De Oca):  Yulissa Randle MD    Procedure: Procedure(s):  COLONOSCOPY    Medications prior to admission:   Prior to Admission medications    Medication Sig Start Date End Date Taking? Authorizing Provider   HYDROcodone-acetaminophen (NORCO) 5-325 MG per tablet Take 1 tablet by mouth 2 times daily as needed for Pain for up to 30 days.  22  Yamilet Beach MD   diazePAM (VALIUM) 5 MG tablet TAKE 1 TABLET BY MOUTH THREE TIMES DAILY AS NEEDED 22  Jai Jaramillo MD   levothyroxine (SYNTHROID) 50 MCG tablet TAKE 1 TABLET BY MOUTH DAILY 22   Jai Jaramillo MD   albuterol sulfate HFA (VENTOLIN HFA) 108 (90 Base) MCG/ACT inhaler Inhale 2 puffs into the lungs 4 times daily as needed for Wheezing 12/15/22   Jai Jaramillo MD   B-D ULTRAFINE III SHORT PEN 31G X 8 MM MISC USE 1 NEEDLE DAILY AS DIRECTED 22   Jai Jaramillo MD   metFORMIN (GLUCOPHAGE) 1000 MG tablet TAKE 1 TABLET BY MOUTH TWICE DAILY 22   Jai Jaramillo MD   omeprazole (PRILOSEC) 40 MG delayed release capsule TAKE 1 CAPSULE BY MOUTH DAILY 22   Jai Jaramillo MD   sertraline (ZOLOFT) 100 MG tablet TAKE 2 TABLETS BY MOUTH EVERY NIGHT AT BEDTIME 22   Jai Jaramillo MD   fenofibrate (TRICOR) 145 MG tablet TAKE 1 TABLET BY MOUTH DAILY 22   Jai Jaramillo MD   BRILINTA 90 MG TABS tablet TAKE 1 TABLET BY MOUTH TWICE DAILY 22   Jai Jaramillo MD   insulin aspart (NOVOLOG) 100 UNIT/ML injection vial ADMINISTER UP TO 70 UNITS UNDER THE SKIN EVERY DAY AS DIRECTED PER SLIDING SCALE  Patient taking differently: Inject 8 Units into the skin 3 times daily (before meals) AS DIRECTED PER SLIDING SCALE 11/15/22   Jai Jaramillo MD   buPROPion (WELLBUTRIN XL) 150 MG extended release tablet TAKE 1 TABLET BY MOUTH EVERY MORNING 10/26/22   Sigrid Hernandez MD   metoprolol succinate (TOPROL XL) 100 MG extended release tablet Take 1 tablet by mouth daily  Patient taking differently: Take 100 mg by mouth at bedtime 9/21/22   Mary Ann Butcher MD   atorvastatin (LIPITOR) 40 MG tablet TAKE 1 TABLET BY MOUTH DAILY  Patient taking differently: Take 40 mg by mouth at bedtime 9/9/22   Mary Ann Butcher MD   Insulin Syringe-Needle U-100 (INSULIN SYRINGE .5CC/30GX5/16\") 30G X 5/16\" 0.5 ML MISC USE THREE TIMES DAILY 8/12/22   Sigrid Hernandez MD   cetirizine (ZYRTEC) 10 MG tablet TAKE 1 TABLET BY MOUTH DAILY  Patient taking differently: Take 10 mg by mouth at bedtime 7/28/22   Sigrid Hernandez MD   insulin aspart (NOVOLOG FLEXPEN) 100 UNIT/ML injection pen Inject 8 Units into the skin 3 times daily (before meals) Along with sliding scale    Historical Provider, MD   insulin glargine (LANTUS) 100 UNIT/ML injection vial Inject 60 Units into the skin nightly    Historical Provider, MD   lisinopril (PRINIVIL;ZESTRIL) 20 MG tablet TAKE 1 TABLET BY MOUTH DAILY.  5/16/22   Sigrid Hernandez MD   tiotropium (SPIRIVA HANDIHALER) 18 MCG inhalation capsule INHALE THE CONTENTS OF 1 CAPSULE VIA INHALATION DEVICE EVERY DAY 4/6/22   Sigrid Hernandez MD   budesonide-formoterol (SYMBICORT) 160-4.5 MCG/ACT AERO INHALE 2 PUFFS BY MOUTH TWICE DAILY 3/10/22   Sigrid Hernandez MD   albuterol (PROVENTIL) (2.5 MG/3ML) 0.083% nebulizer solution Take 3 mLs by nebulization every 4 hours as needed for Wheezing 1/17/22   Sigrid Hernandez MD   aspirin 81 MG tablet Take 81 mg by mouth daily    Historical Provider, MD   OXYGEN Inhale into the lungs 2L continious    Historical Provider, MD       Current medications:    Current Facility-Administered Medications   Medication Dose Route Frequency Provider Last Rate Last Admin    sodium chloride flush 0.9 % injection 5-40 mL  5-40 mL IntraVENous 2 times per day Wang Rodriguez MD  sodium chloride flush 0.9 % injection 5-40 mL  5-40 mL IntraVENous PRN Juan Kolb MD        0.9 % sodium chloride infusion   IntraVENous PRN Juan Kolb MD           Allergies: Allergies   Allergen Reactions    Ciprofloxacin Anaphylaxis and Swelling     Pt states throat swells    Sulfa Antibiotics Anaphylaxis and Swelling     Pt states throat swells and she gets rash       Problem List:    Patient Active Problem List   Diagnosis Code    Influenza A with respiratory manifestations J10.1    Type II or unspecified type diabetes mellitus without mention of complication, uncontrolled [id number]    Mixed hyperlipidemia E78.2    Depression F32. A    Hypertension I10    Pain in joint, multiple sites M25.50    Osteoarthritis M19.90    Other specified disorder of the esophagus K22.89    Sleep apnea G47.30    Chest pain R07.9    CAD (coronary artery disease) I25.10    Chronic hypoxemic respiratory failure ([facility]) J96.11    Respiratory insufficiency R06.89    Chronic respiratory failure with hypoxia ([facility]) J96.11    Smoking addiction F17.200    TR on CPAP G47.33, Z99.89    TR treated with BiPAP G47.33    Moderate COPD (chronic obstructive pulmonary disease) ([facility]) J44.9    Rhinosinusitis J31.0, J32.9    Diarrhea R19.7    Type 2 diabetes mellitus with hyperglycemia ([facility]) E11.65    Anxiety F41.9    Tachycardia R00.0    Bilateral leg pain M79.604, M79.605    Heart murmur T42.8    Diastolic dysfunction E74.63    Claudication ([facility]) I73.9    End stage renal disease ([facility]) N18.6    Morbid obesity ([facility]) E66.01    Opioid use, unspecified with unspecified opioid-induced disorder F11.99    Opioid dependence with unspecified opioid-induced disorder F11.29    Opioid dependence, uncomplicated T15.63       Past Medical History:        Diagnosis Date    Anxiety     Arthritis     CAD (coronary artery disease)     Cancer ([facility])     cervical cancer - cone biopsy done    [facility]ch[facility]     Depression     Diabetes mellitus ([facility]     GERD (gastroesophageal reflux disease)     Graves disease 2022    Hyperlipidemia     Hypertension     Hypothyroidism     Influenza A 2014    Movement disorder     muscle spasms    Oxygen deficit     2L continu    Pneumonia     Psychiatric problem     anxiety and depression    Sleep apnea     USES BI-PAP    Thyroid trouble     Tobacco abuse     Type II or unspecified type diabetes mellitus without mention of complication, not stated as uncontrolled        Past Surgical History:        Procedure Laterality Date    ABDOMEN SURGERY       x 2    CARDIAC SURGERY      stents  and     CERVIX BIOPSY      cone    CHOLECYSTECTOMY      COLONOSCOPY  2018    CORONARY ANGIOPLASTY WITH STENT PLACEMENT  2022    LEEP      abnormal cells (cancerous)    SIGMOIDOSCOPY N/A 2022    FLEXIBLE SIGMOIDOSCOPY performed by Jesse Cordero MD at [facility] N/A 2022    ESOPHAGOGASTRODUODENOSCOPY performed by Jesse Cordero MD at [address] [place] [address] History:    Social History     Tobacco Use    Smoking status: Every Day     Packs/day: 1.00     Years: 43.00     Pack years: 43.00     Types: Cigarettes     Start date: 1973    Smokeless tobacco: Never    Tobacco comments:     cessation 2/15, she was cutting down   Substance Use Topics    Alcohol use:  No                                Ready to quit: No  Counseling given: Yes  Tobacco comments: cessation 2/15, she was cutting down      Vital Signs (Current):   Vitals:    22 1039 23 0745   BP:  130/64   Pulse:  85   Resp:  20   Temp:  (!) 96.7 °F (35.9 °C)   TempSrc:  Temporal   SpO2:  90%   Weight: 212 lb (96.2 kg) 210 lb 8.6 oz (95.5 kg)   Height: 5' 4\" (1.626 m) 5' 4\" (1.626 m)                                              BP Readings from Last 3 Encounters:   23 130/64   22 125/60 12/13/22 130/78       NPO Status:                                                                                 BMI:   Wt Readings from Last 3 Encounters:   01/03/23 210 lb 8.6 oz (95.5 kg)   12/20/22 212 lb 11.2 oz (96.5 kg)   12/13/22 214 lb (97.1 kg)     Body mass index is 36.14 kg/m². CBC:   Lab Results   Component Value Date/Time    WBC 10.1 12/13/2022 01:42 PM    RBC 4.71 12/13/2022 01:42 PM    HGB 9.8 12/13/2022 01:42 PM    HCT 33.0 12/13/2022 01:42 PM    MCV 70 12/13/2022 01:42 PM    RDW 15.2 12/13/2022 01:42 PM     12/13/2022 01:42 PM       CMP:   Lab Results   Component Value Date/Time     12/13/2022 01:42 PM    K 4.6 12/20/2022 10:25 AM    K 5.6 12/13/2022 01:42 PM    K 4.2 08/16/2018 05:12 PM     12/13/2022 01:42 PM    CO2 23 12/13/2022 01:42 PM    BUN 22 12/13/2022 01:42 PM    CREATININE 0.77 12/13/2022 01:42 PM    GFRAA >60 09/13/2022 01:46 PM    GFRAA >60 11/20/2012 08:35 AM    AGRATIO 1.6 12/13/2022 01:42 PM    LABGLOM >60 09/13/2022 01:46 PM    LABGLOM 84 06/03/2015 12:00 AM    GLUCOSE 127 12/13/2022 01:42 PM    PROT 6.9 12/13/2022 01:42 PM    PROT 7.1 11/20/2012 08:35 AM    CALCIUM 9.7 12/13/2022 01:42 PM    BILITOT <0.2 12/13/2022 01:42 PM    ALKPHOS 44 12/13/2022 01:42 PM    AST 24 12/13/2022 01:42 PM    ALT 20 12/13/2022 01:42 PM       POC Tests: No results for input(s): POCGLU, POCNA, POCK, POCCL, POCBUN, POCHEMO, POCHCT in the last 72 hours.     Coags:   Lab Results   Component Value Date/Time    PROTIME 9.9 12/02/2014 04:22 AM    INR 0.92 12/02/2014 04:22 AM    APTT 28.2 06/17/2022 10:03 AM       HCG (If Applicable):   Lab Results   Component Value Date    PREGTESTUR Negative 08/16/2018        ABGs:   Lab Results   Component Value Date/Time    PHART 7.364 02/12/2015 10:45 PM    PO2ART 45.1 02/12/2015 10:45 PM    TTO9CZI 42.9 02/12/2015 10:45 PM    DPZ2QVG 23.8 02/12/2015 10:45 PM    BEART -1.1 02/12/2015 10:45 PM    W7NGYEZG 81.8 02/12/2015 10:45 PM        Type & Screen (If Applicable):  No results found for: LABABO, LABRH    Drug/Infectious Status (If Applicable):  No results found for: HIV, HEPCAB    COVID-19 Screening (If Applicable):   Lab Results   Component Value Date/Time    COVID19 Not Detected 01/17/2022 12:00 AM           Anesthesia Evaluation  Patient summary reviewed no history of anesthetic complications:   Airway: Mallampati: II  TM distance: >3 FB   Neck ROM: full  Mouth opening: > = 3 FB   Dental: normal exam   (+) upper dentures      Pulmonary:normal exam  breath sounds clear to auscultation  (+) COPD: severe,  shortness of breath: chronic,  sleep apnea: on CPAP,      (-) not a current smoker                          ROS comment: HOME O2 2 L/M   Cardiovascular:  Exercise tolerance: poor (<4 METS),   (+) hypertension:, CAD:, CABG/stent:, GHOSH:, hyperlipidemia        Rhythm: regular  Rate: normal                    Neuro/Psych:   (+) depression/anxiety             GI/Hepatic/Renal:   (+) GERD:, renal disease: CRI, morbid obesity          Endo/Other:    (+) Diabetes, hypothyroidism, blood dyscrasia: anemia, arthritis:., malignancy/cancer. Abdominal:             Vascular: negative vascular ROS. Other Findings:           Anesthesia Plan      MAC     ASA 4       Induction: intravenous. Anesthetic plan and risks discussed with patient. Plan discussed with CRNA.     Attending anesthesiologist reviewed and agrees with Nathaniel Schneider MD   1/3/2023

## 2023-01-03 NOTE — H&P
Pre-operative History and Physical    Patient: Richard Pederson  : 1960  Acct#:     Intended Procedure:  colonoscopy     HISTORY OF PRESENT ILLNESS:  The patient is a 65 y.o. female  who presents for colonoscopy due to iron deficiency and polyps seen on flexible sigmoidoscopy not resected due to Brilinta use. Past Medical History:        Diagnosis Date    Anxiety     Arthritis     CAD (coronary artery disease)     Cancer ([facility] [address]     cervical cancer - cone biopsy done    COPD (chronic obstructive pulmonary disease) ([facility] [address]     Depression     Diabetes mellitus ([facility] [address]     GERD (gastroesophageal reflux disease)     Graves disease 2022    Hyperlipidemia     Hypertension     Hypothyroidism     Influenza A 2014    Movement disorder     muscle spasms    Oxygen deficit     2L continu    Pneumonia     Psychiatric problem     anxiety and depression    Sleep apnea     USES BI-PAP    Thyroid trouble     Tobacco abuse     Type II or unspecified type diabetes mellitus without mention of complication, not stated as uncontrolled      Past Surgical History:        Procedure Laterality Date    ABDOMEN SURGERY       x 2    CARDIAC SURGERY      stents  and     CERVIX BIOPSY      cone    CHOLECYSTECTOMY      COLONOSCOPY  2018    CORONARY ANGIOPLASTY WITH STENT PLACEMENT  2022    LEEP      abnormal cells (cancerous)    SIGMOIDOSCOPY N/A 2022    FLEXIBLE SIGMOIDOSCOPY performed by Yulissa Randle MD at [facility] N/A 2022    ESOPHAGOGASTRODUODENOSCOPY performed by Yulissa Randle MD at [facility]     Medications Prior to Admission:   No current facility-administered medications on file prior to encounter.      Current Outpatient Medications on File Prior to Encounter   Medication Sig Dispense Refill    levothyroxine (SYNTHROID) 50 MCG tablet TAKE 1 TABLET BY MOUTH DAILY 90 tablet 0    albuterol sulfate HFA (VENTOLIN HFA) 108 (90 Base) MCG/ACT inhaler Inhale 2 puffs into the lungs 4 times daily as needed for Wheezing 54 g 1    B-D ULTRAFINE III SHORT PEN 31G X 8 MM MISC USE 1 NEEDLE DAILY AS DIRECTED 100 each 2    metFORMIN (GLUCOPHAGE) 1000 MG tablet TAKE 1 TABLET BY MOUTH TWICE DAILY 60 tablet 3    omeprazole (PRILOSEC) 40 MG delayed release capsule TAKE 1 CAPSULE BY MOUTH DAILY 30 capsule 2    sertraline (ZOLOFT) 100 MG tablet TAKE 2 TABLETS BY MOUTH EVERY NIGHT AT BEDTIME 60 tablet 2    fenofibrate (TRICOR) 145 MG tablet TAKE 1 TABLET BY MOUTH DAILY 30 tablet 2    BRILINTA 90 MG TABS tablet TAKE 1 TABLET BY MOUTH TWICE DAILY 60 tablet 2    insulin aspart (NOVOLOG) 100 UNIT/ML injection vial ADMINISTER UP TO 70 UNITS UNDER THE SKIN EVERY DAY AS DIRECTED PER SLIDING SCALE (Patient taking differently: Inject 8 Units into the skin 3 times daily (before meals) AS DIRECTED PER SLIDING SCALE) 10 mL 2    buPROPion (WELLBUTRIN XL) 150 MG extended release tablet TAKE 1 TABLET BY MOUTH EVERY MORNING 30 tablet 2    metoprolol succinate (TOPROL XL) 100 MG extended release tablet Take 1 tablet by mouth daily (Patient taking differently: Take 100 mg by mouth at bedtime) 90 tablet 3    atorvastatin (LIPITOR) 40 MG tablet TAKE 1 TABLET BY MOUTH DAILY (Patient taking differently: Take 40 mg by mouth at bedtime) 90 tablet 3    Insulin Syringe-Needle U-100 (INSULIN SYRINGE .5CC/30GX5/16\") 30G X 5/16\" 0.5 ML MISC USE THREE TIMES DAILY 272 each 2    cetirizine (ZYRTEC) 10 MG tablet TAKE 1 TABLET BY MOUTH DAILY (Patient taking differently: Take 10 mg by mouth at bedtime) 30 tablet 1    insulin aspart (NOVOLOG FLEXPEN) 100 UNIT/ML injection pen Inject 8 Units into the skin 3 times daily (before meals) Along with sliding scale      insulin glargine (LANTUS) 100 UNIT/ML injection vial Inject 60 Units into the skin nightly      lisinopril (PRINIVIL;ZESTRIL) 20 MG tablet TAKE 1 TABLET BY MOUTH DAILY.  30 tablet 5    tiotropium (SPIRIVA HANDIHALER) 18 MCG inhalation capsule INHALE THE CONTENTS OF 1 CAPSULE VIA INHALATION DEVICE EVERY DAY 30 capsule 5    budesonide-formoterol (SYMBICORT) 160-4.5 MCG/ACT AERO INHALE 2 PUFFS BY MOUTH TWICE DAILY 10.2 g 5    albuterol (PROVENTIL) (2.5 MG/3ML) 0.083% nebulizer solution Take 3 mLs by nebulization every 4 hours as needed for Wheezing 360 mL 11    aspirin 81 MG tablet Take 81 mg by mouth daily      OXYGEN Inhale into the lungs 2L continious          Allergies:  Ciprofloxacin and Sulfa antibiotics    Social History:   TOBACCO:   reports that she has been smoking cigarettes. She started smoking about 50 years ago. She has a 43.00 pack-year smoking history. She has never used smokeless tobacco.  ETOH:   reports no history of alcohol use. DRUGS:   reports no history of drug use. PHYSICAL EXAM:      Vital Signs: /64   Pulse 85   Temp (!) 96.7 °F (35.9 °C) (Temporal)   Resp 20   Ht 5' 4\" (1.626 m)   Wt 210 lb 8.6 oz (95.5 kg)   SpO2 90%   BMI 36.14 kg/m²    Airway: No stridor or wheezing noted. Good air movement  Pulmonary: without wheezes. Clear to auscultation  Cardiac:regular rate and rhythm without loud murmurs  Abdomen:soft, nontender,  Bowel sounds present    Pre-Procedure Assessment / Plan:  1) Colonoscopy    ASA Grade:  ASA 3 - Patient with moderate systemic disease with functional limitations  Mallampati Classification:  Class III    Level of Sedation Plan: MAC    Post Procedure plan: Return to same level of care    I assessed the patient and find that the patient is in satisfactory condition to proceed with the planned procedure and sedation plan. I have explained the risk, benefits, and alternatives to the procedure; the patient understands and agrees to proceed.        Scott Ruby MD  1/3/2023

## 2023-01-04 ENCOUNTER — TELEPHONE (OUTPATIENT)
Dept: CARDIOLOGY CLINIC | Age: 63
End: 2023-01-04

## 2023-01-04 NOTE — TELEPHONE ENCOUNTER
Jeane Castle called in this afternoon, she states she was told she has Grave's disease and it has caused her to be anemic she would like to know if she still needs to take the [facility]. She can be reached at [phone].

## 2023-01-05 RX ORDER — CLOPIDOGREL BISULFATE 75 MG/1
75 TABLET ORAL DAILY
Qty: 90 TABLET | Refills: 3 | Status: SHIPPED | OUTPATIENT
Start: 2023-01-05

## 2023-01-05 NOTE — TELEPHONE ENCOUNTER
Discussed with Dr Hilton Leventhal. He does not want her to stop Brilinta. He would like her try Plavix and call with any signs of bleeding. Called and left message for patient to return call.

## 2023-01-05 NOTE — TELEPHONE ENCOUNTER
Patient returned call and made aware of Dr Naye Mills recommendations. She verbalized understanding. Medication list updated.

## 2023-01-05 NOTE — TELEPHONE ENCOUNTER
Jeremiah Jackson called in this morning wanting to talk to Carmela Lance [facility]. She is wanting to know if Carmela Lance RN had a chance to talk to Dr. Dorothy Milian?     You can reach Jeremiah Jackson at #   [phone]

## 2023-02-09 ENCOUNTER — HOSPITAL ENCOUNTER (OUTPATIENT)
Dept: GENERAL RADIOLOGY | Age: 63
Discharge: HOME | End: 2023-02-09
Payer: MEDICARE

## 2023-02-09 ENCOUNTER — HOSPITAL ENCOUNTER (OUTPATIENT)
Age: 63
Discharge: HOME | End: 2023-02-09
Payer: MEDICARE

## 2023-02-09 DIAGNOSIS — M54.9 BACK PAIN, UNSPECIFIED BACK LOCATION, UNSPECIFIED BACK PAIN LATERALITY, UNSPECIFIED CHRONICITY: ICD-10-CM

## 2023-02-09 PROCEDURE — 72220 X-RAY EXAM SACRUM TAILBONE: CPT

## 2023-02-09 PROCEDURE — 72110 X-RAY EXAM L-2 SPINE 4/>VWS: CPT

## 2023-02-15 ENCOUNTER — HOSPITAL ENCOUNTER (OUTPATIENT)
Dept: MRI IMAGING | Age: 63
Discharge: HOME | End: 2023-02-15
Payer: MEDICARE

## 2023-02-15 DIAGNOSIS — M54.16 LUMBAR RADICULITIS: ICD-10-CM

## 2023-02-15 PROCEDURE — 72148 MRI LUMBAR SPINE W/O DYE: CPT

## 2023-03-23 ENCOUNTER — HOSPITAL ENCOUNTER (OUTPATIENT)
Dept: PHYSICAL THERAPY | Age: 63
Setting detail: THERAPIES SERIES
Discharge: HOME | End: 2023-03-23

## 2023-06-12 PROBLEM — E66.01 SEVERE OBESITY (BMI 35.0-39.9) WITH COMORBIDITY (HCC): Status: ACTIVE | Noted: 2023-06-12

## 2023-07-19 NOTE — DISCHARGE INSTRUCTIONS
Discharge Instructions for Colonoscopy     Recommendations:   - Follow-up pathology results in 7 days, by calling the office at [phone].  - Resume regular medications. - Resume diet as tolerated. - Repeat colonoscopy with holding Brilinta as instructed for 5 days. Colonoscopy is a visual exam of the lining of the large intestine, also called the bowel or colon, with a colonoscope. A colonoscope is a flexible tube with a light and a viewing device. It allows the doctor to view the inside of the colon through a tiny video camera. Colonoscopy is performed for many reasons: unexplained anemia , pain, diarrhea , bloody stools, cancer screening, among many other reasons. Complications from a colonoscopy are rare. Some possible serious complications include perforated bowel (which might require surgery) and bleeding (which could require blood transfusion ). Minor complications include bloating, gas, and cramping that can last for 1-2 days after the procedure. Because air is put into your colon during the procedure, it is normal to pass large amounts of air from your rectum. You may not have a bowel movement for 1-3 days after the procedure. What You Will Need:  Someone to drive you home after the procedure     Steps to Take:  [id number] [facility] when you get home. Because the sedative will make you drowsy, don't drive, operate machinery, or make important decisions the day of the procedure. Feelings of bloating, gas, or cramping may persist for 24 hours. Diet -  Try sips of water first. If tolerated, resume bland food (scrambled eggs, toast, soup) first.  If tolerated, resume regular diet or the diet recommended by your physician. Do not drink alcohol for 24 hours. Physical Activity -  Ask your doctor when you will be able to return to work. Do not drive, operate heavy machinery, or do activities that require coordination or balance for 24 hours.    Otherwise, return to your normal routine Time-based billing (NON-critical care) as soon as you are comfortable to do so, which is usually the next day after the procedure. Medications - When taking medications, it's important to: Take your medication as directed, not more, not less, not at a different time. Do not stop taking them without consulting your healthcare provider. Don't share them with anyone else. Know what effects and side effects to expect, and report them to your healthcare provider. If you are taking more than one drug, even if it is an over-the-counter medication, herb, or dietary supplement, be sure to check with a physician or pharmacist about drug interactions. Plan ahead for refills so you don't run out. Lifestyle Changes - The results of your colonoscopy will determine if any lifestyle changes are necessary. Follow-up:  The doctor will usually give you a preliminary report after the medication wears off and you are more alert. The results from a biopsy can take as long as 1-2 weeks to be completed. Schedule a follow-up appointment as directed by your doctor. You should schedule a follow-up colonoscopy as recommended by your doctor. Call Your Doctor If Any of the Following Occurs:  Bleeding from your rectum; notify your doctor if you pass a teaspoonful or more of blood   Black, tarry stools   Severe abdominal pain   Hard, swollen abdomen   Signs of infection, including fever or chills   Inability to pass gas or stool   Coughing, shortness of breath, chest pain, severe nausea or vomiting     In case of an emergency, call 911 immediately.

## 2023-07-24 NOTE — TELEPHONE ENCOUNTER
Last OV:2022  Last Labs:2023  Last Refills:5/15/2023  Next Appt:Keep follow in September to discuss echo results.          Last EK2022

## 2023-07-25 RX ORDER — ATORVASTATIN CALCIUM 40 MG/1
40 TABLET, FILM COATED ORAL DAILY
Qty: 90 TABLET | Refills: 3 | Status: SHIPPED | OUTPATIENT
Start: 2023-07-25

## 2023-09-15 RX ORDER — METOPROLOL SUCCINATE 100 MG/1
100 TABLET, EXTENDED RELEASE ORAL DAILY
Qty: 90 TABLET | Refills: 3 | OUTPATIENT
Start: 2023-09-15

## 2023-12-04 ENCOUNTER — HOSPITAL ENCOUNTER (OUTPATIENT)
Dept: WOUND CARE | Age: 63
Discharge: HOME | End: 2023-12-04

## 2023-12-04 NOTE — PLAN OF CARE
[facility]   Physician Orders and Discharge Instructions  [address], [facility] [address]  Telephone: [phone]        Cancellation       NAME:  Jay Leon OF BIRTH:  1960  MEDICAL RECORD NUMBER:  [id number]  DATE:  12/4/2023  Provider:        Cancelled visits to date:  No-shows to date: 1       Patient status for today's appointment patient:  []  Cancelled  []  Rescheduled appointment:  [x]  No-show     Reason given by patient:  []  Patient ill  []  Conflicting appointment  []  No transportation                []  Conflict with work  []  No reason given  [x]  Other:                Comments:  called after 30 minutes, she decided to go with the home nurse. Phone call information:   [x]  Phone call made today to patient at  9:30 AM EST  at number provided:                 [x]  Patient answered, conversation as follows: No follow up requested or appointment made.                  Electronically signed by Isidra Donovan RN on 12/4/23 at 9:30 AM EST

## 2024-01-02 RX ORDER — CLOPIDOGREL BISULFATE 75 MG/1
75 TABLET ORAL DAILY
Qty: 90 TABLET | Refills: 0 | Status: SHIPPED | OUTPATIENT
Start: 2024-01-02

## 2024-04-01 RX ORDER — CLOPIDOGREL BISULFATE 75 MG/1
75 TABLET ORAL DAILY
Qty: 90 TABLET | Refills: 3 | Status: SHIPPED | OUTPATIENT
Start: 2024-04-01

## 2024-05-07 ENCOUNTER — HOSPITAL ENCOUNTER (OUTPATIENT)
Dept: CT IMAGING | Age: 64
Discharge: HOME | End: 2024-05-07
Attending: INTERNAL MEDICINE
Payer: MEDICARE

## 2024-05-07 ENCOUNTER — HOSPITAL ENCOUNTER (OUTPATIENT)
Dept: WOMENS IMAGING | Age: 64
Discharge: HOME | End: 2024-05-07
Attending: INTERNAL MEDICINE
Payer: MEDICARE

## 2024-05-07 VITALS — BODY MASS INDEX: 34.33 KG/M2 | HEIGHT: 64 IN

## 2024-05-07 DIAGNOSIS — Z12.2 SCREENING FOR LUNG CANCER: ICD-10-CM

## 2024-05-07 DIAGNOSIS — Z12.31 OTHER SCREENING MAMMOGRAM: ICD-10-CM

## 2024-05-07 PROCEDURE — 77067 SCR MAMMO BI INCL CAD: CPT

## 2024-05-07 PROCEDURE — 71271 CT THORAX LUNG CANCER SCR C-: CPT

## 2024-06-28 DIAGNOSIS — I51.89 DIASTOLIC DYSFUNCTION: Primary | ICD-10-CM

## 2024-06-28 NOTE — TELEPHONE ENCOUNTER
Atorvastatin (LIPITOR) 40MG   Last OV: 07/28/22  Next OV: 09/12/24  Last refill: 07/23/23  Most recent Labs: 04/18/24 Lipid Panel  Last EKG (if needed): 07/28/22     Clld pt. Spk w pt's  Erich. Per HIPAA ok to speak with.   Booked appt 09/12/24. Put order in for EKG at next appt.

## 2024-07-03 RX ORDER — ATORVASTATIN CALCIUM 40 MG/1
40 TABLET, FILM COATED ORAL DAILY
Qty: 90 TABLET | Refills: 0 | Status: SHIPPED | OUTPATIENT
Start: 2024-07-03

## 2024-09-26 ENCOUNTER — TELEPHONE (OUTPATIENT)
Dept: CARDIOLOGY CLINIC | Age: 64
End: 2024-09-26

## 2024-09-26 RX ORDER — ATORVASTATIN CALCIUM 40 MG/1
40 TABLET, FILM COATED ORAL DAILY
Qty: 30 TABLET | Refills: 0 | Status: SHIPPED | OUTPATIENT
Start: 2024-09-26

## 2024-10-04 NOTE — TELEPHONE ENCOUNTER
Attempted call. No answer. Mailbox full. Sent SMS notification. Letter created and placed in outgoing mail.

## 2024-10-07 NOTE — TELEPHONE ENCOUNTER
Patient scheduled for the next available appt 4/3/25.   States she's been feeling well and has no new symptoms.     Please advise if this is okay or if she needs to be seen sooner.     Callback: [phone]

## 2024-10-28 RX ORDER — ATORVASTATIN CALCIUM 40 MG/1
40 TABLET, FILM COATED ORAL DAILY
Qty: 30 TABLET | Refills: 0 | Status: SHIPPED | OUTPATIENT
Start: 2024-10-28

## 2024-10-28 NOTE — TELEPHONE ENCOUNTER
Last OV: 22 - [place]  Next OV: 4/3/25 - [place]  Last Labs: 24  Last EK22   Last Filled:    Disp Refills Start End    atorvastatin (LIPITOR) 40 MG tablet 30 tablet 0 2024 --    Sig - Route: TAKE 1 TABLET BY MOUTH DAILY - Oral    Sent to pharmacy as: Atorvastatin Calcium 40 MG Oral Tablet (LIPITOR)    Cosign for Ordering: Accepted by Pascual Fam MD on 2024  9:27 AM    E-Prescribing Status: Receipt confirmed by pharmacy (2024 12:18 PM EDT)

## 2024-11-25 RX ORDER — ATORVASTATIN CALCIUM 40 MG/1
40 TABLET, FILM COATED ORAL DAILY
Qty: 30 TABLET | Refills: 0 | OUTPATIENT
Start: 2024-11-25

## 2024-11-25 NOTE — TELEPHONE ENCOUNTER
Patient last seen in office on 7/28/22.  Patient needs appt.     Called patient left message she needs a appt before any refills. Been 2 years since seen in office.

## 2025-02-17 RX ORDER — ATORVASTATIN CALCIUM 40 MG/1
40 TABLET, FILM COATED ORAL DAILY
Qty: 30 TABLET | Refills: 1 | Status: SHIPPED | OUTPATIENT
Start: 2025-02-17

## 2025-02-17 NOTE — TELEPHONE ENCOUNTER
Last OV: 22- Fam  Next OV: 4/3/25 - Fam  Last Labs: 24  Last EK22   Last Filled: 10/2824 #30 1 Tab QD 0RF    Changed to #30 w/ 1 refill till f/u on 4/3/25

## 2025-03-28 RX ORDER — CLOPIDOGREL BISULFATE 75 MG/1
75 TABLET ORAL DAILY
Qty: 30 TABLET | Refills: 0 | Status: SHIPPED | OUTPATIENT
Start: 2025-03-28

## 2025-03-28 RX ORDER — CLOPIDOGREL BISULFATE 75 MG/1
75 TABLET ORAL DAILY
Qty: 90 TABLET | OUTPATIENT
Start: 2025-03-28

## 2025-03-28 NOTE — TELEPHONE ENCOUNTER
Last OV: 7/28/22  Next OV: 4/3/25  Last refill: 4/1/24 #90 3 R/F  Most recent Labs: 2/28/25  Last EKG (if needed):

## 2025-04-11 ENCOUNTER — TELEPHONE (OUTPATIENT)
Dept: PHARMACY | Facility: CLINIC | Age: 65
End: 2025-04-11

## 2025-04-11 NOTE — TELEPHONE ENCOUNTER
[facility] CLINICAL PHARMACY: ADHERENCE REVIEW  Identified care gap per [employer]: fills at Non-preferred pharmacy: [facility]: Statin adherence    ASSESSMENT  STATIN ADHERENCE    Insurance Records claims through 25 (Prior Year PDC = not reported; YTD PDC = 60%; Potential Fail Date: 25):   ATORVASTATIN TAB 40MG last filled on 25 for 12 day supply. Next refill due: 25    Prescribed si tablet/capsule daily    Per Insurer Portal: last filled on same    Per [facility]:  Patient only received 12 days do to trying to sync her to Save a trip program.    Lab Results   Component Value Date    CHOL 201 (H) 2025    TRIG 209 (H) 2025    HDL 42 2025    LDLDIRECT 136 (H) 10/03/2018     Lab Results   Component Value Date     (H) 2025    LDLDIRECT 136 (H) 10/03/2018      ALT   Date Value Ref Range Status   2025 27 0 - 32 IU/L Final     AST   Date Value Ref Range Status   2025 30 0 - 40 IU/L Final     The 10-year ASCVD risk score (Mau HOFFMAN, et al., 2019) is: 24%    Values used to calculate the score:      Age: 64 years      Sex: Female      Is Non- : No      Diabetic: Yes      Tobacco smoker: Yes      Systolic Blood Pressure: 128 mmHg      Is BP treated: Yes      HDL Cholesterol: 42 mg/dL      Total Cholesterol: 201 mg/dL     PLAN  Per insurer report, LIS-1 - may be able to receive up to a 100-day supply for the same cost as a 30-day supply.      The following are interventions that have been identified:   Patient OVERDUE refilling ATORVASTATIN TAB 40MG and active on home medication list.   Patient eligible for 100 day supply of ATORVASTATIN TAB 40MG  Patient filling at a non-preferred pharmacy    Attempting to reach patient to review.  Left message asking for return call. [employer] message sent to patient.  18 tablets left on rx. Provider will only take calls from pharmacy filling.    Last Visit: 24  Next Visit:

## 2025-04-19 ENCOUNTER — TELEPHONE (OUTPATIENT)
Dept: CASE MANAGEMENT | Age: 65
End: 2025-04-19

## 2025-04-24 RX ORDER — ATORVASTATIN CALCIUM 40 MG/1
40 TABLET, FILM COATED ORAL DAILY
Qty: 90 TABLET | Refills: 3 | Status: CANCELLED | OUTPATIENT
Start: 2025-04-24

## 2025-04-24 NOTE — TELEPHONE ENCOUNTER
Please see attached. [employer] requesting 100/90 day supply.  Was filled by Dr. Beach on 4/14/25 #30 with 1 refill

## 2025-04-25 ENCOUNTER — TELEPHONE (OUTPATIENT)
Dept: CARDIOLOGY CLINIC | Age: 65
End: 2025-04-25

## 2025-04-25 RX ORDER — ATORVASTATIN CALCIUM 40 MG/1
40 TABLET, FILM COATED ORAL DAILY
Qty: 30 TABLET | Refills: 1 | OUTPATIENT
Start: 2025-04-25

## 2025-04-25 RX ORDER — CLOPIDOGREL BISULFATE 75 MG/1
75 TABLET ORAL DAILY
Qty: 30 TABLET | Refills: 0 | Status: SHIPPED | OUTPATIENT
Start: 2025-04-25

## 2025-04-25 NOTE — TELEPHONE ENCOUNTER
Patient not seen by Dr. Fam since 7/28/22  Will need to obtain refills through PCP or make follow up appt with Dr Fam for more refills   Thanks

## 2025-04-25 NOTE — TELEPHONE ENCOUNTER
Last OV: 07/28/2022  Next OV: x      Please see last ov 07/28/2022:   \"Keep follow in September to discuss echo results. \"    Pt did not get echo. Please advise if we should reschedule echo and apt after echo

## 2025-04-25 NOTE — TELEPHONE ENCOUNTER
Patient cancelled fu 4/3/25.  30 day sent so patient is not without however will need follow up for refills and to discuss plan of care or to obtain refills through PCP   Thanks

## 2025-04-28 NOTE — TELEPHONE ENCOUNTER
Called patient left message to return call to office and set up appt with Dr. Fam for any additional refills.

## 2025-04-28 NOTE — TELEPHONE ENCOUNTER
Called patient left message to return call to office and set up appt with Dr. Fam before any additional refills.

## 2025-05-27 RX ORDER — CLOPIDOGREL BISULFATE 75 MG/1
75 TABLET ORAL DAILY
Qty: 30 TABLET | Refills: 0 | Status: SHIPPED | OUTPATIENT
Start: 2025-05-27

## 2025-05-27 NOTE — TELEPHONE ENCOUNTER
Requested Prescriptions     Pending Prescriptions Disp Refills    clopidogrel (PLAVIX) 75 MG tablet [Pharmacy Med Name: CLOPIDOGREL 75MG TABLETS] 30 tablet 0     Sig: TAKE 1 TABLET BY MOUTH DAILY        Last OV: 7/28/2022  Next OV: 8/21/2025  Last refill:4/25/2025

## 2025-06-03 PROBLEM — F11.99 OPIOID USE, UNSPECIFIED WITH UNSPECIFIED OPIOID-INDUCED DISORDER (HCC): Status: RESOLVED | Noted: 2021-07-21 | Resolved: 2025-06-03

## 2025-06-03 PROBLEM — F11.29 OPIOID DEPENDENCE WITH UNSPECIFIED OPIOID-INDUCED DISORDER (HCC): Status: RESOLVED | Noted: 2021-07-21 | Resolved: 2025-06-03

## 2025-06-03 PROBLEM — F11.20 OPIOID DEPENDENCE, UNCOMPLICATED (HCC): Status: RESOLVED | Noted: 2021-07-21 | Resolved: 2025-06-03

## 2025-06-03 PROBLEM — N18.6 END STAGE RENAL DISEASE (HCC): Status: RESOLVED | Noted: 2021-01-06 | Resolved: 2025-06-03

## 2025-06-15 ENCOUNTER — TELEPHONE (OUTPATIENT)
Dept: CASE MANAGEMENT | Age: 65
End: 2025-06-15

## 2025-06-24 NOTE — TELEPHONE ENCOUNTER
Requested Prescriptions     Pending Prescriptions Disp Refills    clopidogrel (PLAVIX) 75 MG tablet [Pharmacy Med Name: CLOPIDOGREL 75MG TABLETS] 30 tablet 0     Sig: TAKE 1 TABLET BY MOUTH DAILY        Last OV: 7/28/22  Next OV: 8/21/2025  Last refill:5/27/2025

## 2025-06-25 RX ORDER — CLOPIDOGREL BISULFATE 75 MG/1
75 TABLET ORAL DAILY
Qty: 30 TABLET | Refills: 0 | Status: SHIPPED | OUTPATIENT
Start: 2025-06-25

## 2025-07-13 ENCOUNTER — TELEPHONE (OUTPATIENT)
Dept: CASE MANAGEMENT | Age: 65
End: 2025-07-13

## 2025-07-24 RX ORDER — CLOPIDOGREL BISULFATE 75 MG/1
75 TABLET ORAL DAILY
Qty: 30 TABLET | Refills: 1 | Status: SHIPPED | OUTPATIENT
Start: 2025-07-24

## 2025-07-24 NOTE — TELEPHONE ENCOUNTER
Last OV: 7/28/22  Next OV: 8/21/25  Last refill: 6/25/25 #30  Most recent Labs: 2/28/25  Last EKG (if needed):

## (undated) DEVICE — BITE BLOCK ENDOSCP AD 60 FR W/ ADJ STRP PLAS GRN BLOX

## (undated) DEVICE — ENDOSCOPY KIT: Brand: MEDLINE INDUSTRIES, INC.

## (undated) DEVICE — SNARES COLD OVAL 10MM THIN

## (undated) DEVICE — TRAP POLYP ETRAP

## (undated) DEVICE — FORCEPS BX 240CM 2.4MM L NDL RAD JAW 4 M00513334